# Patient Record
Sex: FEMALE | Race: BLACK OR AFRICAN AMERICAN | Employment: OTHER | URBAN - METROPOLITAN AREA
[De-identification: names, ages, dates, MRNs, and addresses within clinical notes are randomized per-mention and may not be internally consistent; named-entity substitution may affect disease eponyms.]

---

## 2017-05-16 PROBLEM — E55.9 VITAMIN D DEFICIENCY: Status: ACTIVE | Noted: 2017-05-16

## 2017-05-30 ENCOUNTER — HOSPITAL ENCOUNTER (EMERGENCY)
Age: 67
Discharge: HOME | End: 2017-05-31
Attending: EMERGENCY MEDICINE
Payer: MEDICARE

## 2017-05-30 DIAGNOSIS — E86.0 DEHYDRATION: ICD-10-CM

## 2017-05-30 DIAGNOSIS — R55 VASOVAGAL SYNCOPE: Primary | ICD-10-CM

## 2017-05-30 LAB
ALBUMIN SERPL BCP-MCNC: 3.4 G/DL (ref 3.2–4.6)
ALBUMIN/GLOB SERPL: 0.8 {RATIO} (ref 1.2–3.5)
ALP SERPL-CCNC: 68 U/L (ref 50–136)
ALT SERPL-CCNC: 12 U/L (ref 12–65)
ANION GAP BLD CALC-SCNC: 13 MMOL/L (ref 7–16)
AST SERPL W P-5'-P-CCNC: 9 U/L (ref 15–37)
BACTERIA URNS QL MICRO: ABNORMAL /HPF
BASOPHILS # BLD AUTO: 0 K/UL (ref 0–0.2)
BASOPHILS # BLD: 0 % (ref 0–2)
BILIRUB SERPL-MCNC: 0.3 MG/DL (ref 0.2–1.1)
BUN SERPL-MCNC: 22 MG/DL (ref 8–23)
CALCIUM SERPL-MCNC: 9.4 MG/DL (ref 8.3–10.4)
CASTS URNS QL MICRO: ABNORMAL /LPF
CHLORIDE SERPL-SCNC: 113 MMOL/L (ref 98–107)
CO2 SERPL-SCNC: 21 MMOL/L (ref 21–32)
CREAT SERPL-MCNC: 1.78 MG/DL (ref 0.6–1)
CRYSTALS URNS QL MICRO: 0 /LPF
DIFFERENTIAL METHOD BLD: ABNORMAL
EOSINOPHIL # BLD: 0 K/UL (ref 0–0.8)
EOSINOPHIL NFR BLD: 0 % (ref 0.5–7.8)
EPI CELLS #/AREA URNS HPF: ABNORMAL /HPF
ERYTHROCYTE [DISTWIDTH] IN BLOOD BY AUTOMATED COUNT: 18.3 % (ref 11.9–14.6)
GLOBULIN SER CALC-MCNC: 4.5 G/DL (ref 2.3–3.5)
GLUCOSE SERPL-MCNC: 109 MG/DL (ref 65–100)
HCT VFR BLD AUTO: 37.6 % (ref 35.8–46.3)
HGB BLD-MCNC: 12.1 G/DL (ref 11.7–15.4)
IMM GRANULOCYTES # BLD: 0.1 K/UL (ref 0–0.5)
IMM GRANULOCYTES NFR BLD AUTO: 0.4 % (ref 0–5)
LYMPHOCYTES # BLD AUTO: 18 % (ref 13–44)
LYMPHOCYTES # BLD: 2.3 K/UL (ref 0.5–4.6)
MCH RBC QN AUTO: 24.5 PG (ref 26.1–32.9)
MCHC RBC AUTO-ENTMCNC: 32.2 G/DL (ref 31.4–35)
MCV RBC AUTO: 76.3 FL (ref 79.6–97.8)
MONOCYTES # BLD: 0.9 K/UL (ref 0.1–1.3)
MONOCYTES NFR BLD AUTO: 7 % (ref 4–12)
MUCOUS THREADS URNS QL MICRO: ABNORMAL /LPF
NEUTS SEG # BLD: 9.4 K/UL (ref 1.7–8.2)
NEUTS SEG NFR BLD AUTO: 75 % (ref 43–78)
OTHER OBSERVATIONS,UCOM: ABNORMAL
PLATELET # BLD AUTO: 568 K/UL (ref 150–450)
PMV BLD AUTO: 9.8 FL (ref 10.8–14.1)
POTASSIUM SERPL-SCNC: 3.7 MMOL/L (ref 3.5–5.1)
PROT SERPL-MCNC: 7.9 G/DL (ref 6.3–8.2)
RBC # BLD AUTO: 4.93 M/UL (ref 4.05–5.25)
RBC #/AREA URNS HPF: ABNORMAL /HPF
SODIUM SERPL-SCNC: 147 MMOL/L (ref 136–145)
TROPONIN I SERPL-MCNC: <0.02 NG/ML (ref 0.02–0.05)
WBC # BLD AUTO: 12.8 K/UL (ref 4.3–11.1)
WBC URNS QL MICRO: ABNORMAL /HPF
YEAST URNS QL MICRO: ABNORMAL

## 2017-05-30 PROCEDURE — 81015 MICROSCOPIC EXAM OF URINE: CPT | Performed by: EMERGENCY MEDICINE

## 2017-05-30 PROCEDURE — 85025 COMPLETE CBC W/AUTO DIFF WBC: CPT | Performed by: EMERGENCY MEDICINE

## 2017-05-30 PROCEDURE — 74011250637 HC RX REV CODE- 250/637: Performed by: EMERGENCY MEDICINE

## 2017-05-30 PROCEDURE — 74011250636 HC RX REV CODE- 250/636: Performed by: EMERGENCY MEDICINE

## 2017-05-30 PROCEDURE — 84484 ASSAY OF TROPONIN QUANT: CPT | Performed by: EMERGENCY MEDICINE

## 2017-05-30 PROCEDURE — 99285 EMERGENCY DEPT VISIT HI MDM: CPT | Performed by: EMERGENCY MEDICINE

## 2017-05-30 PROCEDURE — 96360 HYDRATION IV INFUSION INIT: CPT | Performed by: EMERGENCY MEDICINE

## 2017-05-30 PROCEDURE — 80053 COMPREHEN METABOLIC PANEL: CPT | Performed by: EMERGENCY MEDICINE

## 2017-05-30 PROCEDURE — 93005 ELECTROCARDIOGRAM TRACING: CPT | Performed by: EMERGENCY MEDICINE

## 2017-05-30 PROCEDURE — 96361 HYDRATE IV INFUSION ADD-ON: CPT | Performed by: EMERGENCY MEDICINE

## 2017-05-30 RX ORDER — ACETAMINOPHEN 500 MG
1000 TABLET ORAL
Status: COMPLETED | OUTPATIENT
Start: 2017-05-30 | End: 2017-05-30

## 2017-05-30 RX ADMIN — ACETAMINOPHEN 1000 MG: 500 TABLET ORAL at 23:28

## 2017-05-30 RX ADMIN — SODIUM CHLORIDE 1000 ML: 900 INJECTION, SOLUTION INTRAVENOUS at 22:00

## 2017-05-30 NOTE — LETTER
[facility] [facility] EMERGENCY DEPT One [address] [id number] [phone] Work/School Note Date: 5/30/2017 To Whom It May concern: 
 
Chandler Cuellar was seen and treated today in the emergency room by the following provider(s): 
Attending Provider: Fadumo Luis DO. Chandler Cuellar may return to work on 6/1/17. Sincerely, Giovany Villeda RN

## 2017-05-30 NOTE — ED TRIAGE NOTES
Patient to ed via ems from work with c/o syncope while at work--ems #20 R AC--ems bgl 123--patient receiving IV fluids by ems--patient reports she became hot and dizzy while at work and reports the next thing she knew her coworkers were standing around her--awake, alert and oriented x4 in triage

## 2017-05-31 VITALS
HEIGHT: 67 IN | HEART RATE: 86 BPM | WEIGHT: 195 LBS | SYSTOLIC BLOOD PRESSURE: 172 MMHG | RESPIRATION RATE: 16 BRPM | DIASTOLIC BLOOD PRESSURE: 80 MMHG | OXYGEN SATURATION: 98 % | BODY MASS INDEX: 30.61 KG/M2 | TEMPERATURE: 98.4 F

## 2017-05-31 LAB
ATRIAL RATE: 71 BPM
CALCULATED P AXIS, ECG09: 56 DEGREES
CALCULATED R AXIS, ECG10: 9 DEGREES
CALCULATED T AXIS, ECG11: 44 DEGREES
DIAGNOSIS, 93000: NORMAL
P-R INTERVAL, ECG05: 206 MS
Q-T INTERVAL, ECG07: 386 MS
QRS DURATION, ECG06: 78 MS
QTC CALCULATION (BEZET), ECG08: 419 MS
VENTRICULAR RATE, ECG03: 71 BPM

## 2017-05-31 NOTE — ED NOTES
I have reviewed discharge instructions with the patient. The patient verbalized understanding. Patient had an even and steady gait out to lobby.

## 2017-05-31 NOTE — ED NOTES
Pt resting comfortably on stretcher. Family at bedside. Patient in NAD at this time, waiting for provider.

## 2017-05-31 NOTE — ED PROVIDER NOTES
HPI Comments: Patient presents per front after syncopal episode at work today. 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 She states that while she was at work she became lightheaded and diaphoretic and then while seated at her desk apparently had a syncopal episode. She states she members putting her head down on her desk and the next thing she knew she was on the floor with coworkers attempting to wake her up. She denies any pain from the fall although she appeared to sustain a small abrasion to her scalp. She denies prior occurrence syncopal episode but reports recent illness with an upper respiratory infection last week but had her out of work for several days and this was her first day back at work. Work conditions lately he has been hot and humid. Patient is a 78 y.o. female presenting with syncope. The history is provided by the patient. Syncope    This is a new problem. The current episode started 3 to 5 hours ago. Episode frequency: no prior. The problem has been resolved. She lost consciousness for a period of less than one minute. Associated with: seated. Associated symptoms include head injury. Pertinent negatives include no visual change, no chest pain, no palpitations, no clumsiness, no confusion, no fever, no malaise/fatigue, no abdominal pain, no bowel incontinence, no bladder incontinence, no congestion, no headaches, no back pain, no focal weakness, no light-headedness, no seizures, no slurred speech and no melena. She has tried nothing for the symptoms. The treatment provided no relief. Her past medical history is significant for HTN.         Past Medical History:   Diagnosis Date    Anemia     Chronic back pain     Essential hypertension     GERD (gastroesophageal reflux disease) 8/14/2012    Large Hiatal hernia     Obesity, Class I, BMI 30-34.9     Renal cyst, acquired, right     Spondylosis of lumbar joint     Vitamin D deficiency 5/16/2017       Past Surgical History:   Procedure Laterality Date    HX APPENDECTOMY      HX JAYLYN AND BSO           Family History:   Problem Relation Age of Onset    Cancer Mother      breast    Diabetes Mother     Breast Cancer Mother     Cancer Sister      breast    Breast Cancer Sister     Heart Disease Father      Infection s/p CABG    Arthritis-osteo Brother      Back and Knees    Obesity Sister     Diabetes Sister     Arthritis-osteo Sister      Needs Knee Replacement    Crohn's Disease Sister     Psychiatric Disorder Brother        Social History     Social History    Marital status:      Spouse name: N/A    Number of children: N/A    Years of education: N/A     Occupational History    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 Review of Systems   Constitutional: Negative for chills, fever and malaise/fatigue. HENT: Negative for congestion. Respiratory: Negative for chest tightness and shortness of breath. Cardiovascular: Positive for syncope. Negative for chest pain and palpitations. Gastrointestinal: Negative for abdominal pain, bowel incontinence and melena. Genitourinary: Negative for bladder incontinence. Musculoskeletal: Negative for back pain. Neurological: Negative for focal weakness, seizures, light-headedness and headaches. Psychiatric/Behavioral: Negative for confusion. All other systems reviewed and are negative.       Vitals:    05/30/17 1935 05/30/17 2029 05/30/17 2030 05/30/17 2032   BP: 128/72 154/74 134/85 142/85   Pulse: 79 84 88 84 Resp: 17      Temp: 98.2 °F (36.8 °C)      SpO2: 99%      Weight: 88.5 kg (195 lb)      Height: 5' 7\" (1.702 m)               Physical Exam   Constitutional: She is oriented to person, place, and time. She appears well-developed and well-nourished. No distress. HENT:   Head: Normocephalic. Right Ear: External ear normal.   Left Ear: External ear normal.   Mouth/Throat: No oropharyngeal exudate. Dry mucous membranes, tiny abrasion to the occipital area of the scalp no active bleeding   Eyes: Conjunctivae and EOM are normal. Pupils are equal, round, and reactive to light. Neck: Normal range of motion. Neck supple. Cardiovascular: Normal rate, regular rhythm and normal heart sounds. Pulmonary/Chest: Effort normal and breath sounds normal.   Abdominal: Soft. Bowel sounds are normal.   Neurological: She is alert and oriented to person, place, and time. Skin: Skin is warm and dry. Psychiatric: She has a normal mood and affect. Her behavior is normal.   Nursing note and vitals reviewed. MDM  Number of Diagnoses or Management Options  Diagnosis management comments: Given the elevated creatinine the patient will be given a liter of fluid. Although she is not orthostatic. Awaiting urinalysis.   Discharged follow-up with primary care physician and/or work well with instructions to increase by mouth fluids at home       Amount and/or Complexity of Data Reviewed  Clinical lab tests: ordered and reviewed  Tests in the radiology section of CPT®: ordered and reviewed  Independent visualization of images, tracings, or specimens: yes (EKG at 1937: Normal sinus rhythm, rate of 71 no acute ischemic changes are noted and no ectopy.)    Risk of Complications, Morbidity, and/or Mortality  Presenting problems: high  Diagnostic procedures: moderate  Management options: moderate    Patient Progress  Patient progress: stable    ED Course       Procedures

## 2017-05-31 NOTE — DISCHARGE INSTRUCTIONS
Dehydration: Care Instructions  Your Care Instructions  Dehydration happens when your body loses too much fluid. This might happen when you do not drink enough water or you lose large amounts of fluids from your body because of diarrhea, vomiting, or sweating. Severe dehydration can be life-threatening. Water and minerals called electrolytes help put your body fluids back in balance. Learn the early signs of fluid loss, and drink more fluids to prevent dehydration. Follow-up care is a key part of your treatment and safety. Be sure to make and go to all appointments, and call your doctor if you are having problems. It's also a good idea to know your test results and keep a list of the medicines you take. How can you care for yourself at home? · To prevent dehydration, drink plenty of fluids, enough so that your urine is light yellow or clear like water. Choose water and other caffeine-free clear liquids until you feel better. If you have kidney, heart, or liver disease and have to limit fluids, talk with your doctor before you increase the amount of fluids you drink. · If you do not feel like eating or drinking, try taking small sips of water, sports drinks, or other rehydration drinks. · Get plenty of rest.  To prevent dehydration  · Add more fluids to your diet and daily routine, unless your doctor has told you not to. · During hot weather, drink more fluids. Drink even more fluids if you exercise a lot. Stay away from drinks with alcohol or caffeine. · Watch for the symptoms of dehydration. These include:  ¨ A dry, sticky mouth. ¨ Dark yellow urine, and not much of it. ¨ Dry and sunken eyes. ¨ Feeling very tired. · Learn what problems can lead to dehydration. These include:  ¨ Diarrhea, fever, and vomiting. ¨ Any illness with a fever, such as pneumonia or the flu. ¨ Activities that cause heavy sweating, such as endurance races and heavy outdoor work in hot or humid weather.   ¨ Alcohol or drug abuse or withdrawal.  ¨ Certain medicines, such as cold and allergy pills (antihistamines), diet pills (diuretics), and laxatives. ¨ Certain diseases, such as diabetes, cancer, and heart or kidney disease. When should you call for help? Call 911 anytime you think you may need emergency care. For example, call if:  · You passed out (lost consciousness). Call your doctor now or seek immediate medical care if:  · You are confused and cannot think clearly. · You are dizzy or lightheaded, or you feel like you may faint. · You have signs of needing more fluids. You have sunken eyes and a dry mouth, and you pass only a little dark urine. · You cannot keep fluids down. Watch closely for changes in your health, and be sure to contact your doctor if:  · You are not making tears. · Your skin is very dry and sags slowly back into place after you pinch it. · Your mouth and eyes are very dry. Where can you learn more? Go to http://yvonne-catalina.info/. Enter V515 in the search box to learn more about \"Dehydration: Care Instructions. \"  Current as of: May 27, 2016  Content Version: 11.2  © 3300-0276 [employer]. Care instructions adapted under license by [employer] (which disclaims liability or warranty for this information). If you have questions about a medical condition or this instruction, always ask your healthcare professional. [place] [address] any warranty or liability for your use of this information. Fainting: Care Instructions  Your Care Instructions    When you faint, or pass out, you lose consciousness for a short time. A brief drop in blood flow to the brain often causes it. When you fall or lie down, more blood flows to your brain and you regain consciousness. Emotional stress, pain, or overheating--especially if you have been standing--can make you faint. In these cases, fainting is usually not serious.  But fainting can be a sign of a more serious problem. Your doctor may want you to have more tests to rule out other causes. The treatment you need depends on the reason why you fainted. The doctor has checked you carefully, but problems can develop later. If you notice any problems or new symptoms, get medical treatment right away. Follow-up care is a key part of your treatment and safety. Be sure to make and go to all appointments, and call your doctor if you are having problems. It's also a good idea to know your test results and keep a list of the medicines you take. How can you care for yourself at home? · Drink plenty of fluids to prevent dehydration. If you have kidney, heart, or liver disease and have to limit fluids, talk with your doctor before you increase your fluid intake. When should you call for help? Call 911 anytime you think you may need emergency care. For example, call if:  · You have symptoms of a heart problem. These may include:  ¨ Chest pain or pressure. ¨ Severe trouble breathing. ¨ A fast or irregular heartbeat. ¨ Lightheadedness or sudden weakness. ¨ Coughing up pink, foamy mucus. ¨ Passing out. After you call 911, the  may tell you to chew 1 adult-strength or 2 to 4 low-dose aspirin. Wait for an ambulance. Do not try to drive yourself. · You have symptoms of a stroke. These may include:  ¨ Sudden numbness, tingling, weakness, or loss of movement in your face, arm, or leg, especially on only one side of your body. ¨ Sudden vision changes. ¨ Sudden trouble speaking. ¨ Sudden confusion or trouble understanding simple statements. ¨ Sudden problems with walking or balance. ¨ A sudden, severe headache that is different from past headaches. · You passed out (lost consciousness) again. Watch closely for changes in your health, and be sure to contact your doctor if:  · You do not get better as expected. Where can you learn more? Go to http://yvonne-catalina.info/.   Enter P904 in the search box to learn more about \"Fainting: Care Instructions. \"  Current as of: May 27, 2016  Content Version: 11.2  © 5877-1499 [employer]. Care instructions adapted under license by [employer] (which disclaims liability or warranty for this information). If you have questions about a medical condition or this instruction, always ask your healthcare professional. [place] [address] any warranty or liability for your use of this information.

## 2017-06-01 ENCOUNTER — PATIENT OUTREACH (OUTPATIENT)
Dept: CASE MANAGEMENT | Age: 67
End: 2017-06-01

## 2017-06-01 NOTE — PROGRESS NOTES
Attempted NAVNEET #1 no answer, left vmail requesting return call. Scheduled NAVNEET #2 for tomorrow 6/2/17. Eddie Hallman LPN/ Care Coordinator  [facility] [address]. [place] [address] / [place], [address] [facility]  [email or web address]. [facility] note will not be viewable in [address]

## 2017-06-02 ENCOUNTER — PATIENT OUTREACH (OUTPATIENT)
Dept: CASE MANAGEMENT | Age: 67
End: 2017-06-02

## 2017-06-02 NOTE — PROGRESS NOTES
Attempted NAVNEET #2 no answer, left vmail and scheduled NAVNEET #3 6/8/17. Zack Boone LPN/ Care Coordinator  [address]. [facility] [place], [address] [facility]  [email or web address]

## 2017-06-08 ENCOUNTER — PATIENT OUTREACH (OUTPATIENT)
Dept: CASE MANAGEMENT | Age: 67
End: 2017-06-08

## 2017-06-09 NOTE — PROGRESS NOTES
Attempted NAVNEET #3. Left vmail requesting return call. UTR patient Will close case. Kash Escalante LPN/ Care Coordinator  [address]. [facility] [place], [address] [facility]  [email or web address] note will not be viewable in [address]

## 2017-07-14 PROBLEM — R55 SYNCOPE: Status: ACTIVE | Noted: 2017-07-14

## 2017-07-14 PROBLEM — R00.2 PALPITATIONS: Status: ACTIVE | Noted: 2017-07-14

## 2017-07-17 ENCOUNTER — HOSPITAL ENCOUNTER (OUTPATIENT)
Dept: MRI IMAGING | Age: 67
Discharge: HOME | End: 2017-07-17
Attending: INTERNAL MEDICINE

## 2017-07-17 DIAGNOSIS — I10 ESSENTIAL HYPERTENSION: ICD-10-CM

## 2017-07-17 DIAGNOSIS — R42 DIZZINESS: ICD-10-CM

## 2017-07-17 NOTE — PROGRESS NOTES
Patient to reschedule exam when she does not have hair gel in hair. The gel is causing artifact on images.

## 2017-07-19 ENCOUNTER — HOSPITAL ENCOUNTER (OUTPATIENT)
Dept: MRI IMAGING | Age: 67
Discharge: HOME | End: 2017-07-19
Attending: INTERNAL MEDICINE
Payer: MEDICARE

## 2017-07-19 DIAGNOSIS — M54.50 LOW BACK PAIN RADIATING TO RIGHT LEG: ICD-10-CM

## 2017-07-19 DIAGNOSIS — M79.604 LOW BACK PAIN RADIATING TO RIGHT LEG: ICD-10-CM

## 2017-07-19 DIAGNOSIS — M54.2 NECK PAIN: ICD-10-CM

## 2017-07-19 DIAGNOSIS — M79.601 RIGHT ARM PAIN: ICD-10-CM

## 2017-07-19 PROCEDURE — 72141 MRI NECK SPINE W/O DYE: CPT

## 2017-07-19 PROCEDURE — 72148 MRI LUMBAR SPINE W/O DYE: CPT

## 2017-07-31 PROBLEM — K63.5 COLON POLYPS: Status: ACTIVE | Noted: 2017-07-26

## 2017-08-07 ENCOUNTER — HOSPITAL ENCOUNTER (OUTPATIENT)
Dept: MRI IMAGING | Age: 67
Discharge: HOME | End: 2017-08-07
Attending: INTERNAL MEDICINE

## 2017-08-07 NOTE — PROGRESS NOTES
Patient came for MRI brain for second attempt due to hair spray causing artifact on images. She still had the same artifact on the images. Patient was advised to reschedule MRI again and not wear any product in her hair.

## 2017-08-17 ENCOUNTER — HOSPITAL ENCOUNTER (OUTPATIENT)
Dept: MRI IMAGING | Age: 67
Discharge: HOME | End: 2017-08-17
Attending: INTERNAL MEDICINE
Payer: MEDICARE

## 2017-08-17 PROCEDURE — 70551 MRI BRAIN STEM W/O DYE: CPT

## 2017-08-18 PROBLEM — M51.9 LUMBAR DISC DISEASE: Status: ACTIVE | Noted: 2017-08-18

## 2017-08-18 NOTE — PROGRESS NOTES
Will discuss results further at the following upcoming appointment:   8/18/2017  8:20 Eric Johnson MD          [facility]  8/24/2017  9:00 AM    Leeanna Pagan MD     [facility] UC       UCD  9/6/2017   9:20 AM    Deneen Schwarz MD          [facility]  9/6/2017   10:00 AM   [facility] MCR WELLNESS           [facility]  9/8/2017   8:30 AM    Paulino Almonte MD        [facility]  11/13/2017 8:20 AM    SFE DEXA BI GE LUNAR DEXA  SFERMAM        SFE  11/13/2017 8:45 AM    SFE BENITO BI ROOM 1          SFERMAM        SFE

## 2017-09-08 ENCOUNTER — HOSPITAL ENCOUNTER (OUTPATIENT)
Dept: GENERAL RADIOLOGY | Age: 67
Discharge: HOME | End: 2017-09-08
Payer: MEDICARE

## 2017-09-08 DIAGNOSIS — M54.16 LUMBAR RADICULOPATHY: ICD-10-CM

## 2017-09-08 PROCEDURE — 72100 X-RAY EXAM L-S SPINE 2/3 VWS: CPT

## 2017-09-08 PROCEDURE — 72120 X-RAY BEND ONLY L-S SPINE: CPT

## 2017-09-08 NOTE — PROGRESS NOTES
Will discuss results further at the following upcoming appointment:   9/14/2017  9:00 AM    Karyn Leyden, PT  [facility]  9/18/2017  8:20 AM    Carolyn Shelton MD          [facility]  10/5/2017  9:00 AM    Paolo Torres MD     [facility]       UCD  10/25/2017 8:30 AM    Merline Fox, MD        [address] [place] [address]  11/13/2017 8:20 AM    SFE DEXA BI GE LUNAR DEXA  SFERMAM        SFE  11/13/2017 8:45 AM    SFE BENITO BI ROOM 1          SFERMAM        SFE

## 2017-09-08 NOTE — PROGRESS NOTES
Will discuss results further at the following upcoming appointment:   9/14/2017  9:00 AM    Lashell Holden PT  [facility]  9/18/2017  8:20 AM    Jakub Sanchez MD          [facility]  10/5/2017  9:00 AM    Homer Donahue MD     [facility] UC       UCD  10/25/2017 8:30 AM    Cortney Calvin MD        [address] [facility] [address]  11/13/2017 8:20 AM    SFE DEXA BI  LUNAR DEXA  SFERMAM        SFE  11/13/2017 8:45 AM    SFE BENITO BI ROOM 1          SFERMAM        SFE

## 2017-09-14 ENCOUNTER — HOSPITAL ENCOUNTER (OUTPATIENT)
Dept: PHYSICAL THERAPY | Age: 67
Discharge: HOME | End: 2017-09-14
Attending: NEUROLOGICAL SURGERY
Payer: MEDICARE

## 2017-09-14 DIAGNOSIS — M48.061 SPINAL STENOSIS OF LUMBAR REGION WITH RADICULOPATHY: ICD-10-CM

## 2017-09-14 DIAGNOSIS — M54.16 SPINAL STENOSIS OF LUMBAR REGION WITH RADICULOPATHY: ICD-10-CM

## 2017-09-14 PROCEDURE — 97110 THERAPEUTIC EXERCISES: CPT

## 2017-09-14 PROCEDURE — G8979 MOBILITY GOAL STATUS: HCPCS

## 2017-09-14 PROCEDURE — 97161 PT EVAL LOW COMPLEX 20 MIN: CPT

## 2017-09-14 PROCEDURE — G8978 MOBILITY CURRENT STATUS: HCPCS

## 2017-09-14 NOTE — PROGRESS NOTES
Ambulatory/Rehab Services H2 Model Falls Risk Assessment    Risk Factor Pts. ·   Confusion/Disorientation/Impulsivity  []    4 ·   Symptomatic Depression  []   2 ·   Altered Elimination  []   1 ·   Dizziness/Vertigo  []   1 ·   Gender (Male)  []   1 ·   Any administered antiepileptics (anticonvulsants):  []   2 ·   Any administered benzodiazepines:  []   1 ·   Visual Impairment (specify):  []   1 ·   Portable Oxygen Use  []   1 ·   Orthostatic ? BP  []   1 ·   History of Recent Falls (within 3 mos.)  []   5     Ability to Rise from Chair (choose one) Pts. ·   Ability to rise in a single movement  []   0 ·   Pushes up, successful in one attempt  []   1 ·   Multiple attempts, but successful  []   3 ·   Unable to rise without assistance  []   4   Total: (5 or greater = High Risk) 0     Falls Prevention Plan:   []                Physical Limitations to Exercise (specify):   []                Mobility Assistance Device (type):   []                Exercise/Equipment Adaptation (specify):    ©2010 [facility] [address] [place] Patent #[id number].  Federal Law prohibits the replication, distribution or use without written permission from [facility] [employer]

## 2017-09-14 NOTE — PROGRESS NOTES
Sinan Hall  : 1950 [facility], [facility], [address]  [phone]   [phone]         OUTPATIENT PHYSICAL THERAPY:Initial Assessment and Treatment Day: Day of Assessment 2017    ICD-10: Treatment Diagnosis: M54.5 Low back pain  Precautions/Allergies:   Review of patient's allergies indicates no known allergies. Fall Risk Score: 0 (? 5 = High Risk)  MD Orders: PT evaluation and treatment  MEDICAL/REFERRING DIAGNOSIS:  Spinal stenosis of lumbar region with radiculopathy [M48.06, M54.16]   DATE OF ONSET: Acute on Chronic   REFERRING PHYSICIAN: Cornel Ron MD  RETURN PHYSICIAN APPOINTMENT: Pending      INITIAL ASSESSMENT:  Ms. Mohit Trammell presents with lumbar degenerative changes with L4/L5 extrusion with concurrent movement restriction, lumbar and right gluteal and sacroiliac region with limitations trunk mobility, CORE and proximal LE strength loss. She has been unable to work. Strength is limited hip abduction and hip extension 3+/5. Trunk mobility at 66% except for extension 33% limited. Skilled PT is indicated for her functional mobility deficits and increase CORE stabilization. PROBLEM LIST:  1. Decreased Strength affecting function  2. Decreased ADL/Functional Activities  3. Decreased Flexibility/joint mobility  4. Increased Pain affecting function  5. Decreased Activity tolerance   6. Increased Fatigue affecting function    INTERVENTIONS PLANNED:  1. Balance Exercise  2. Gait Training  3. Home Exercise Program (HEP)  4. Neuromuscular Re-education/Strengthening  5. Therapeutic Activites  6. Therapeutic Exercise/Strengthening  7. Transfer Training  8. Manual Therapy      TREATMENT PLAN: Effective Dates: 2017 TO 2017   Frequency/Duration: up to 3 times a week for 12 weeks  GOALS: (Goals have been discussed and agreed upon with patient.)  DISCHARGE GOALS: Time Frame: 12 weeks   1.  Pt will be compliant and independent with HEP in order to increase lumbar mobility and LE and core strength/endurance to improve quality of life. 2. Pt will reduce score on Modified Oswestry Low Back Pain Questionnaire to 20/50 in order to demonstrate improved functional mobility and quality of life. 3. Pt will report standing for > 60 minutes with minimal to no increase in pain in order to be able to stand for prolonged periods as needed to perform standing for home activities. 4. Patient to demonstrate increased strength in bilateral LE's 1/2 muscle grade. REHABILITATION POTENTIAL FOR STATED GOALS: GOOD     PLAN : Patient to continue with treatment per plan of care progressing to functional goal achievement utilizing interventions per problem list up to 3 x's per week until goals met or reassessment, or discharge. Regarding Victor Hugo Flores's therapy, I certify that the treatment plan above will be carried out by a therapist or under their direction. Thank you for this referral,  Maki Hua PT     Referring Physician Signature: Sarah Cheung MD              Date                    The information in this section was collected on 9/16/2017  (except where otherwise noted). HISTORY:    History of Present Injury/Illness (Reason for Referral)  This kind patient is reporting pain in the lower spine with intermittent spinal parasthesia. She had an MRI which indicated the following findings :    1. At L4-5, there is a focal central disc extrusion resulting in severe central  stenosis. There is also mild left neural foraminal narrowing. 2. Multilevel lumbar spondylosis at additional levels as described. Neurosurgery has opted for physical therapy and [facility]IC for remission. She is improving overall about 25% but still has quite a bit of pain reported, which has kept her from working recently. She desires to be more active and increase mobility with return to prior function.       Past Medical History/Comorbidities:   Ms. Giovanna Martin  has a past medical history of Anemia; Chronic back pain; Colon polyps (07/26/2017); Essential hypertension; GERD (gastroesophageal reflux disease) (8/14/2012); Large Hiatal hernia; Lumbar disc disease (8/18/2017); Obesity, Class I, BMI 30-34.9; Renal cyst, acquired, right; Spondylosis of lumbar joint; and Vitamin D deficiency (5/16/2017). Ms. Giovanna Martin  has a past surgical history that includes manolo and bso; appendectomy; endoscopy (07/26/2017); and colonoscopy (07/26/2017). Social History/Living Environment:     No limitation  Prior Level of Function/Work/Activity:  Had been independent with all functional mobility, currently with limited mobility and ambulation, unable to work at this time. Dominant Side:         RIGHT  Personal Factors:          Sex:  female        Age:  75 y.o. Current Medications:       Current Outpatient Prescriptions:     meloxicam (MOBIC) 7.5 mg tablet, Take 1 Tab by mouth two (2) times daily (after meals). , Disp: 60 Tab, Rfl: 2    HYDROcodone-acetaminophen (NORCO)  mg tablet, Take 1/2 to 1 tab three times daily as needed for pain, Disp: 84 Tab, Rfl: 0    metoprolol succinate (TOPROL-XL) 50 mg XL tablet, Take 1 Tab by mouth daily. , Disp: 30 Tab, Rfl: 3    esomeprazole (NEXIUM) 40 mg capsule, TAKE 1 CAPSULE DAILY, Disp: 90 Cap, Rfl: 3    cholecalciferol (VITAMIN D3) 1,000 unit tablet, Take  by mouth daily. , Disp: , Rfl:    Date Last Reviewed:  9/16/2017     OBJECTIVE :      ROM:  TRUNK ROM limited at 66% in all planes of motion and hip extension at 33%    PALPATION :   Palpably tender in the lumbar spine and right SI region.        Strength:  Hip strength limited at bilateral sides at 3+/5 hip extension, abduction, and flexion ,  CORE strength at 3+/5 or less   Neurological Screen:       Dermatomes: intact      Sensation: intact bilateral LE's light touch and pressure        Functional Mobility:       Gait/Ambulation: antalgic with decreased devan and mobility      Transfers:  decreased transfers at slow independent mobility, slightly antalgic    Balance:  Single leg stance at less than 5 seconds each side. Mental Status:  Intact alert, oriented, coherent and lucidity. Number of Personal Factors/Comorbidities that affect the Plan of Care: 1-2: MODERATE COMPLEXITY   EXAMINATION:         Body Functions Affected:  1. Neuromusculoskeletal  2. Movement Related  3. Skin Related  4. Digestive  5. Metobolic/Endocrine Activities and Participation Affected:  1. General Tasks and Demands  2. Mobility  3. Self Care  4. Domestic Life Body Structures Involved:  1. Nerves  2. Bones  3. Joints  4. Muscles   5. Ligaments    Number of elements (examined above) that affect the Plan of Care:  3: MODERATE COMPLEXITY   CLINICAL PRESENTATION:      Presentation: Stable and uncomplicated: LOW COMPLEXITY   CLINICAL DECISION MAKING:    Outcome Measure: Tool Used: Modified Oswestry Low Back Pain Questionnaire  Score:  Initial: 28/50  Most Recent: X/50 (Date: -- )   Interpretation of Score: Each section is scored on a 0-5 scale, 5 representing the greatest disability. The scores of each section are added together for a total score of 50. Score 0 1-10 11-20 21-30 31-40 41-49 50   Modifier CH CI CJ CK CL CM CN     ? Mobility - Walking and Moving Around:     - CURRENT STATUS: CK - 40%-59% impaired, limited or restricted    - GOAL STATUS: CJ - 20%-39% impaired, limited or restricted    - D/C STATUS:  ---------------To be determined---------------      Medical Necessity:   · Patient demonstrates good rehab potential due to higher previous functional level. Reason for Services/Other Comments:  · Patient continues to require skilled intervention due to medical complications, patient unable to attend/participate in therapy as expected and decreased mobility affecting work and functional status.  ·      Use of outcome tool(s) and clinical judgement create a POC that gives a: Clear prediction of patient's progress: LOW COMPLEXITY            TREATMENT:   (In addition to Assessment/Re-Assessment sessions the following treatments were rendered)  Pre-treatment Symptoms/Complaints:  7/10 in the lower back with mechanical movement   Pain: Initial:     7/10 in the lower back and the LE's. Post Session:  6/10 in the spine and LE's improved post therapeutic exercise      THERAPEUTIC EXERCISE: (15 mins minutes):  Exercises per grid below to improve mobility, strength, balance, coordination and dynamic movement of leg - bilateral to improve functional dynamic mobility . Required minimal visual, verbal, manual and tactile cues to promote proper body alignment, promote proper body posture and promote proper body mechanics. Progressed repetitions as indicated. Date:  9/14/2017  Date:   Date:     Activity/Exercise Parameters Parameters Parameters   Supine with trunk reciprocation   15 x's in supine      Gluteal activation  20 x's and with walking                                    Modalities with heat in supine with heat for the lower spine to increase mobility and blood flow for 15 mins with LE's elevated. [employer] Portal  Treatment/Session Assessment:    · Response to Treatment:  Improved mobility   · Compliance with Program/Exercises: compliant all of the time. · Recommendations/Intent for next treatment session: \"Next visit will focus on advancements to more challenging activities, reduction in assistance provided and LE mobility and transfers with LB strengthening. \".   Total Treatment Duration:  PT Patient Time In/Time Out  Time In: 0900  Time Out: [address], PT

## 2017-09-18 ENCOUNTER — HOSPITAL ENCOUNTER (OUTPATIENT)
Dept: PHYSICAL THERAPY | Age: 67
Discharge: HOME | End: 2017-09-18
Attending: NEUROLOGICAL SURGERY
Payer: MEDICARE

## 2017-09-18 PROBLEM — F32.A DEPRESSION: Status: ACTIVE | Noted: 2017-09-18

## 2017-09-18 PROCEDURE — 97110 THERAPEUTIC EXERCISES: CPT

## 2017-09-18 NOTE — PROGRESS NOTES
Kelli Dey  : 1950 [facility], [address], [address]  [phone]   [phone]         OUTPATIENT PHYSICAL THERAPY:Initial Assessment and Treatment Day: Day of Assessment 2017    ICD-10: Treatment Diagnosis: M54.5 Low back pain  Precautions/Allergies:   Review of patient's allergies indicates no known allergies. Fall Risk Score: 0 (? 5 = High Risk)  MD Orders: PT evaluation and treatment  MEDICAL/REFERRING DIAGNOSIS:  Spinal stenosis of lumbar region with radiculopathy [M48.06, M54.16]   DATE OF ONSET: Acute on Chronic   REFERRING PHYSICIAN: Jared Garcia MD  RETURN PHYSICIAN APPOINTMENT: Pending      INITIAL ASSESSMENT:  Ms. Saskia Gama presents with lumbar degenerative changes with L4/L5 extrusion with concurrent movement restriction, lumbar and right gluteal and sacroiliac region with limitations trunk mobility, CORE and proximal LE strength loss. She has been unable to work. Strength is limited hip abduction and hip extension 3+/5. Trunk mobility at 66% except for extension 33% limited. Skilled PT is indicated for her functional mobility deficits and increase CORE stabilization. PROBLEM LIST:  1. Decreased Strength affecting function  2. Decreased ADL/Functional Activities  3. Decreased Flexibility/joint mobility  4. Increased Pain affecting function  5. Decreased Activity tolerance   6. Increased Fatigue affecting function    INTERVENTIONS PLANNED:  1. Balance Exercise  2. Gait Training  3. Home Exercise Program (HEP)  4. Neuromuscular Re-education/Strengthening  5. Therapeutic Activites  6. Therapeutic Exercise/Strengthening  7. Transfer Training  8. Manual Therapy      TREATMENT PLAN: Effective Dates: 2017 TO 2017   Frequency/Duration: up to 3 times a week for 12 weeks  GOALS: (Goals have been discussed and agreed upon with patient.)  DISCHARGE GOALS: Time Frame: 12 weeks   1.  Pt will be compliant and independent with HEP in order to increase lumbar mobility and LE and core strength/endurance to improve quality of life. 2. Pt will reduce score on Modified Oswestry Low Back Pain Questionnaire to 20/50 in order to demonstrate improved functional mobility and quality of life. 3. Pt will report standing for > 60 minutes with minimal to no increase in pain in order to be able to stand for prolonged periods as needed to perform standing for home activities. 4. Patient to demonstrate increased strength in bilateral LE's 1/2 muscle grade. REHABILITATION POTENTIAL FOR STATED GOALS: GOOD     PLAN : Patient to continue with treatment per plan of care progressing to functional goal achievement utilizing interventions per problem list up to 3 x's per week until goals met or reassessment, or discharge. Regarding Jerald Flores's therapy, I certify that the treatment plan above will be carried out by a therapist or under their direction. Thank you for this referral,  Alanna Grey PTA     Referring Physician Signature: Anca Wells MD              Date                    The information in this section was collected on 9/18/2017  (except where otherwise noted). HISTORY:    History of Present Injury/Illness (Reason for Referral)  This kind patient is reporting pain in the lower spine with intermittent spinal parasthesia. She had an MRI which indicated the following findings :    1. At L4-5, there is a focal central disc extrusion resulting in severe central  stenosis. There is also mild left neural foraminal narrowing. 2. Multilevel lumbar spondylosis at additional levels as described. Neurosurgery has opted for physical therapy and [facility] for remission. She is improving overall about 25% but still has quite a bit of pain reported, which has kept her from working recently. She desires to be more active and increase mobility with return to prior function.       Past Medical History/Comorbidities:   Ms. Yahir Beach  has a past medical history of Anemia; Chronic back pain; Colon polyps (07/26/2017); Depression (9/18/2017); Essential hypertension; GERD (gastroesophageal reflux disease) (8/14/2012); Large Hiatal hernia; Lumbar disc disease (8/18/2017); Obesity, Class I, BMI 30-34.9; Renal cyst, acquired, right; Spondylosis of lumbar joint; and Vitamin D deficiency (5/16/2017). Ms. Yahir Beach  has a past surgical history that includes manolo and bso; appendectomy; endoscopy (07/26/2017); and colonoscopy (07/26/2017). Social History/Living Environment:     No limitation  Prior Level of Function/Work/Activity:  Had been independent with all functional mobility, currently with limited mobility and ambulation, unable to work at this time. Dominant Side:         RIGHT  Personal Factors:          Sex:  female        Age:  75 y.o. Current Medications:       Current Outpatient Prescriptions:     FLUoxetine (PROZAC) 10 mg capsule, Take 10mg daily for 7 days then increase to 20mg daily (2 caps), Disp: 60 Cap, Rfl: 3    meloxicam (MOBIC) 7.5 mg tablet, Take 1 Tab by mouth two (2) times daily (after meals). , Disp: 60 Tab, Rfl: 2    HYDROcodone-acetaminophen (NORCO)  mg tablet, Take 1/2 to 1 tab three times daily as needed for pain, Disp: 84 Tab, Rfl: 0    metoprolol succinate (TOPROL-XL) 50 mg XL tablet, Take 1 Tab by mouth daily. , Disp: 30 Tab, Rfl: 3    esomeprazole (NEXIUM) 40 mg capsule, TAKE 1 CAPSULE DAILY, Disp: 90 Cap, Rfl: 3    cholecalciferol (VITAMIN D3) 1,000 unit tablet, Take  by mouth daily. , Disp: , Rfl:    Date Last Reviewed:  9/18/2017     OBJECTIVE :      ROM:  TRUNK ROM limited at 66% in all planes of motion and hip extension at 33%    PALPATION :   Palpably tender in the lumbar spine and right SI region.        Strength:  Hip strength limited at bilateral sides at 3+/5 hip extension, abduction, and flexion ,  CORE strength at 3+/5 or less   Neurological Screen: Dermatomes: intact      Sensation: intact bilateral LE's light touch and pressure        Functional Mobility:       Gait/Ambulation: antalgic with decreased devan and mobility      Transfers:  decreased transfers at slow independent mobility, slightly antalgic    Balance:  Single leg stance at less than 5 seconds each side. Mental Status:  Intact alert, oriented, coherent and lucidity. Number of Personal Factors/Comorbidities that affect the Plan of Care: 1-2: MODERATE COMPLEXITY   EXAMINATION:         Body Functions Affected:  1. Neuromusculoskeletal  2. Movement Related  3. Skin Related  4. Digestive  5. Metobolic/Endocrine Activities and Participation Affected:  1. General Tasks and Demands  2. Mobility  3. Self Care  4. Domestic Life Body Structures Involved:  1. Nerves  2. Bones  3. Joints  4. Muscles   5. Ligaments    Number of elements (examined above) that affect the Plan of Care:  3: MODERATE COMPLEXITY   CLINICAL PRESENTATION:      Presentation: Stable and uncomplicated: LOW COMPLEXITY   CLINICAL DECISION MAKING:    Outcome Measure: Tool Used: Modified Oswestry Low Back Pain Questionnaire  Score:  Initial: 28/50  Most Recent: X/50 (Date: -- )   Interpretation of Score: Each section is scored on a 0-5 scale, 5 representing the greatest disability. The scores of each section are added together for a total score of 50. Score 0 1-10 11-20 21-30 31-40 41-49 50   Modifier CH CI CJ CK CL CM CN     ? Mobility - Walking and Moving Around:     - CURRENT STATUS: CK - 40%-59% impaired, limited or restricted    - GOAL STATUS: CJ - 20%-39% impaired, limited or restricted    - D/C STATUS:  ---------------To be determined---------------      Medical Necessity:   · Patient demonstrates good rehab potential due to higher previous functional level.   Reason for Services/Other Comments:  · Patient continues to require skilled intervention due to medical complications, patient unable to attend/participate in therapy as expected and decreased mobility affecting work and functional status. ·      Use of outcome tool(s) and clinical judgement create a POC that gives a: Clear prediction of patient's progress: LOW COMPLEXITY            TREATMENT:   (In addition to Assessment/Re-Assessment sessions the following treatments were rendered)  Pre-treatment Symptoms/Complaints:  5/10 in the lower back with mechanical movement and pain down both legs at times. Patient states she saw MD this a.m. For a follow up appointment and blood pressure is high. Patient states she is having a endoscopy tomorrow and has been off of her medications since Friday and that is probably why her blood pressure is elevated today. Patient states she is anemic and they are testing her to find where she is loosing blood. Patient states that she feels blah today. No headache. Blood pressure prior to treatment 166/106 and heart rate 81  Blood pressure after treatment 155/91 and hear rate 74            Pain: Initial:     5/10 in the lower back and the LE's. Post Session:  No change in pain     THERAPEUTIC EXERCISE: (  25 minutes):  Exercises per grid below to improve mobility, strength, balance, coordination and dynamic movement of leg - bilateral to improve functional dynamic mobility . Required minimal visual, verbal, manual and tactile cues to promote proper body alignment, promote proper body posture and promote proper body mechanics. Progressed repetitions as indicated.    Date:  9/14/2017  Date:  9/18/17 Date:     Activity/Exercise Parameters Parameters Parameters   Supine with trunk reciprocation   15 x's in supine  X 10 reps in supine  B    Gluteal activation  20 x's and with walking  Hook lying  X 20 reps     Single knee to chest  3 x 20 second hold B  (DKTC increased groin pain)    Piriformis stretch   3 x 30 second hold B     Transversus abdominis  10 x 5 second hold                   MODALITIES: (15 minutes) patient supine with knee wedge in place for moist heat to low back to improve blood flow and improve mobility. [employer] Portal  Treatment/Session Assessment:  Patient with some discomfort with some exercises but instructed to perform all exercises in comfortable range. Patient understood and agreeable. Patient given written handouts of HEP. · Response to Treatment:  Improved mobility   · Compliance with Program/Exercises: compliant all of the time. · Recommendations/Intent for next treatment session: \"Next visit will focus on advancements to more challenging activities, reduction in assistance provided and LE mobility and transfers with LB strengthening. \".   Total Treatment Duration:  PT Patient Time In/Time Out  Time In: 1430  Time Out: [address], PTA

## 2017-09-20 ENCOUNTER — HOSPITAL ENCOUNTER (OUTPATIENT)
Dept: PHYSICAL THERAPY | Age: 67
Discharge: HOME | End: 2017-09-20
Attending: NEUROLOGICAL SURGERY
Payer: MEDICARE

## 2017-09-20 PROCEDURE — 97110 THERAPEUTIC EXERCISES: CPT

## 2017-09-20 NOTE — PROGRESS NOTES
Cammy Terrazas  : 1950 [facility], [address], [facility], [address] [facility]  [phone]   [phone]         OUTPATIENT PHYSICAL THERAPY:Daily Note 2017    ICD-10: Treatment Diagnosis: M54.5 Low back pain  Precautions/Allergies:   Review of patient's allergies indicates no known allergies. Fall Risk Score: 0 (? 5 = High Risk)  MD Orders: PT evaluation and treatment  MEDICAL/REFERRING DIAGNOSIS:  Spinal stenosis of lumbar region with radiculopathy [M48.06, M54.16]   DATE OF ONSET: Acute on Chronic   REFERRING PHYSICIAN: Dagoberto Maurer MD  RETURN PHYSICIAN APPOINTMENT: Pending      INITIAL ASSESSMENT:  Ms. Saint Clair presents with lumbar degenerative changes with L4/L5 extrusion with concurrent movement restriction, lumbar and right gluteal and sacroiliac region with limitations trunk mobility, CORE and proximal LE strength loss. She has been unable to work. Strength is limited hip abduction and hip extension 3+/5. Trunk mobility at 66% except for extension 33% limited. Skilled PT is indicated for her functional mobility deficits and increase CORE stabilization. PROBLEM LIST:  1. Decreased Strength affecting function  2. Decreased ADL/Functional Activities  3. Decreased Flexibility/joint mobility  4. Increased Pain affecting function  5. Decreased Activity tolerance   6. Increased Fatigue affecting function    INTERVENTIONS PLANNED:  1. Balance Exercise  2. Gait Training  3. Home Exercise Program (HEP)  4. Neuromuscular Re-education/Strengthening  5. Therapeutic Activites  6. Therapeutic Exercise/Strengthening  7. Transfer Training  8. Manual Therapy      TREATMENT PLAN: Effective Dates: 2017 TO 2017   Frequency/Duration: up to 3 times a week for 12 weeks  GOALS: (Goals have been discussed and agreed upon with patient.)  DISCHARGE GOALS: Time Frame: 12 weeks   1.  Pt will be compliant and independent with HEP in order to increase lumbar mobility and LE and core strength/endurance to improve quality of life. 2. Pt will reduce score on Modified Oswestry Low Back Pain Questionnaire to 20/50 in order to demonstrate improved functional mobility and quality of life. 3. Pt will report standing for > 60 minutes with minimal to no increase in pain in order to be able to stand for prolonged periods as needed to perform standing for home activities. 4. Patient to demonstrate increased strength in bilateral LE's 1/2 muscle grade. REHABILITATION POTENTIAL FOR STATED GOALS: GOOD     PLAN : Patient to continue with treatment per plan of care progressing to functional goal achievement utilizing interventions per problem list up to 3 x's per week until goals met or reassessment, or discharge. Regarding Le Flores's therapy, I certify that the treatment plan above will be carried out by a therapist or under their direction. Thank you for this referral,  Staci Joshua PT     Referring Physician Signature: Virgie Rolle MD              Date                    The information in this section was collected on 9/20/2017  (except where otherwise noted). HISTORY:    History of Present Injury/Illness (Reason for Referral)  This kind patient is reporting pain in the lower spine with intermittent spinal parasthesia. She had an MRI which indicated the following findings :    1. At L4-5, there is a focal central disc extrusion resulting in severe central  stenosis. There is also mild left neural foraminal narrowing. 2. Multilevel lumbar spondylosis at additional levels as described. Neurosurgery has opted for physical therapy and ERIC for remission. She is improving overall about 25% but still has quite a bit of pain reported, which has kept her from working recently. She desires to be more active and increase mobility with return to prior function.       Past Medical History/Comorbidities:   Ms. Paolo Garcia  has a past medical history of Anemia; Chronic back pain; Colon polyps (07/26/2017); Depression (9/18/2017); Essential hypertension; GERD (gastroesophageal reflux disease) (8/14/2012); Large Hiatal hernia; Lumbar disc disease (8/18/2017); Obesity, Class I, BMI 30-34.9; Renal cyst, acquired, right; Spondylosis of lumbar joint; and Vitamin D deficiency (5/16/2017). Ms. Mohit Trammell  has a past surgical history that includes manolo and bso; appendectomy; endoscopy (07/26/2017); and colonoscopy (07/26/2017). Social History/Living Environment:     No limitation  Prior Level of Function/Work/Activity:  Had been independent with all functional mobility, currently with limited mobility and ambulation, unable to work at this time. Dominant Side:         RIGHT  Personal Factors:          Sex:  female        Age:  75 y.o. Current Medications:       Current Outpatient Prescriptions:     FLUoxetine (PROZAC) 10 mg capsule, Take 10mg daily for 7 days then increase to 20mg daily (2 caps), Disp: 60 Cap, Rfl: 3    meloxicam (MOBIC) 7.5 mg tablet, Take 1 Tab by mouth two (2) times daily (after meals). , Disp: 60 Tab, Rfl: 2    HYDROcodone-acetaminophen (NORCO)  mg tablet, Take 1/2 to 1 tab three times daily as needed for pain, Disp: 84 Tab, Rfl: 0    metoprolol succinate (TOPROL-XL) 50 mg XL tablet, Take 1 Tab by mouth daily. , Disp: 30 Tab, Rfl: 3    esomeprazole (NEXIUM) 40 mg capsule, TAKE 1 CAPSULE DAILY, Disp: 90 Cap, Rfl: 3    cholecalciferol (VITAMIN D3) 1,000 unit tablet, Take  by mouth daily. , Disp: , Rfl:    Date Last Reviewed:  9/20/2017     OBJECTIVE :      ROM:  TRUNK ROM limited at 66% in all planes of motion and hip extension at 33%    PALPATION :   Palpably tender in the lumbar spine and right SI region.        Strength:  Hip strength limited at bilateral sides at 3+/5 hip extension, abduction, and flexion ,  CORE strength at 3+/5 or less   Neurological Screen:       Dermatomes: intact      Sensation: intact bilateral LE's light touch and pressure        Functional Mobility:       Gait/Ambulation: antalgic with decreased devan and mobility      Transfers:  decreased transfers at slow independent mobility, slightly antalgic    Balance:  Single leg stance at less than 5 seconds each side. Mental Status:  Intact alert, oriented, coherent and lucidity. Number of Personal Factors/Comorbidities that affect the Plan of Care: 1-2: MODERATE COMPLEXITY   EXAMINATION:         Body Functions Affected:  1. Neuromusculoskeletal  2. Movement Related  3. Skin Related  4. Digestive  5. Metobolic/Endocrine Activities and Participation Affected:  1. General Tasks and Demands  2. Mobility  3. Self Care  4. Domestic Life Body Structures Involved:  1. Nerves  2. Bones  3. Joints  4. Muscles   5. Ligaments    Number of elements (examined above) that affect the Plan of Care:  3: MODERATE COMPLEXITY   CLINICAL PRESENTATION:      Presentation: Stable and uncomplicated: LOW COMPLEXITY   CLINICAL DECISION MAKING:    Outcome Measure: Tool Used: Modified Oswestry Low Back Pain Questionnaire  Score:  Initial: 28/50  Most Recent: X/50 (Date: -- )   Interpretation of Score: Each section is scored on a 0-5 scale, 5 representing the greatest disability. The scores of each section are added together for a total score of 50. Score 0 1-10 11-20 21-30 31-40 41-49 50   Modifier CH CI CJ CK CL CM CN     ? Mobility - Walking and Moving Around:     - CURRENT STATUS: CK - 40%-59% impaired, limited or restricted    - GOAL STATUS: CJ - 20%-39% impaired, limited or restricted    - D/C STATUS:  ---------------To be determined---------------      Medical Necessity:   · Patient demonstrates good rehab potential due to higher previous functional level.   Reason for Services/Other Comments:  · Patient continues to require skilled intervention due to medical complications, patient unable to attend/participate in therapy as expected and decreased mobility affecting work and functional status. ·      Use of outcome tool(s) and clinical judgement create a POC that gives a: Clear prediction of patient's progress: LOW COMPLEXITY            TREATMENT:   (In addition to Assessment/Re-Assessment sessions the following treatments were rendered)  Pre-treatment Symptoms/Complaints:   Blood pressure prior to treatment 166/103 and heart rate 81  Blood pressure during nustep 156/96 and hear rate 74  She states blood pressure medication is being adjusted. Pain: Initial:     5/10 in the lower back and the LE's. Post Session:  No change in pain     THERAPEUTIC EXERCISE: (  40 minutes):  Exercises per grid below to improve mobility, strength, balance, coordination and dynamic movement of leg - bilateral to improve functional dynamic mobility . Required minimal visual, verbal, manual and tactile cues to promote proper body alignment, promote proper body posture and promote proper body mechanics. Progressed repetitions as indicated. Date:  9/20/17 Date:     Activity/Exercise Parameters Parameters   Supine with trunk reciprocation  X 10 reps in supine  B    Gluteal activation  Hook lying  X 20 reps     Single knee to chest 3 x 20 second hold B  (DKTC increased groin pain)    Piriformis stretch  3 x 30 second hold B     Transversus abdominis 10 x 5 second hold     Nustep for skilled trunk reciprocation  15 mins level 2     Standing SLR  15 x's flexion, abduction, extension            MODALITIES: (15 minutes) patient supine with knee wedge in place for moist heat to low back to improve blood flow and improve mobility. [facility]  Treatment/Session Assessment:  Patient with some discomfort with some exercises but instructed to perform all exercises in comfortable range. Patient understood and agreeable. Patient given written handouts of HEP. · Response to Treatment:  Improved mobility, monitoring blood pressure as patient continues. · Compliance with Program/Exercises: compliant all of the time. · Recommendations/Intent for next treatment session: \"Next visit will focus on advancements to more challenging activities, reduction in assistance provided and LE mobility and transfers with LB strengthening. \".   Total Treatment Duration:  PT Patient Time In/Time Out  Time In: 0930  Time Out: [address], [address] [facility]

## 2017-09-22 ENCOUNTER — HOSPITAL ENCOUNTER (OUTPATIENT)
Dept: PHYSICAL THERAPY | Age: 67
Discharge: HOME | End: 2017-09-22
Attending: NEUROLOGICAL SURGERY
Payer: MEDICARE

## 2017-09-25 ENCOUNTER — HOSPITAL ENCOUNTER (OUTPATIENT)
Dept: PHYSICAL THERAPY | Age: 67
Discharge: HOME | End: 2017-09-25
Attending: NEUROLOGICAL SURGERY
Payer: MEDICARE

## 2017-09-25 PROCEDURE — 97110 THERAPEUTIC EXERCISES: CPT

## 2017-09-25 NOTE — PROGRESS NOTES
Erich Villasenor  : 1950 [facility], [address] [facility] [place], [address]  [phone]   [phone]         OUTPATIENT PHYSICAL THERAPY:Daily Note 2017    ICD-10: Treatment Diagnosis: M54.5 Low back pain  Precautions/Allergies:   Review of patient's allergies indicates no known allergies. Fall Risk Score: 0 (? 5 = High Risk)  MD Orders: PT evaluation and treatment  MEDICAL/REFERRING DIAGNOSIS:  Spinal stenosis of lumbar region with radiculopathy [M48.06, M54.16]   DATE OF ONSET: Acute on Chronic   REFERRING PHYSICIAN: Penelope Landrum MD  RETURN PHYSICIAN APPOINTMENT: Pending      INITIAL ASSESSMENT:  Ms. Elsy Zuluaga presents with lumbar degenerative changes with L4/L5 extrusion with concurrent movement restriction, lumbar and right gluteal and sacroiliac region with limitations trunk mobility, CORE and proximal LE strength loss. She has been unable to work. Strength is limited hip abduction and hip extension 3+/5. Trunk mobility at 66% except for extension 33% limited. Skilled PT is indicated for her functional mobility deficits and increase CORE stabilization. PROBLEM LIST:  1. Decreased Strength affecting function  2. Decreased ADL/Functional Activities  3. Decreased Flexibility/joint mobility  4. Increased Pain affecting function  5. Decreased Activity tolerance   6. Increased Fatigue affecting function    INTERVENTIONS PLANNED:  1. Balance Exercise  2. Gait Training  3. Home Exercise Program (HEP)  4. Neuromuscular Re-education/Strengthening  5. Therapeutic Activites  6. Therapeutic Exercise/Strengthening  7. Transfer Training  8. Manual Therapy      TREATMENT PLAN: Effective Dates: 2017 TO 2017   Frequency/Duration: up to 3 times a week for 12 weeks  GOALS: (Goals have been discussed and agreed upon with patient.)  DISCHARGE GOALS: Time Frame: 12 weeks   1.  Pt will be compliant and independent with HEP in order to increase lumbar mobility and LE and core strength/endurance to improve quality of life. 2. Pt will reduce score on Modified Oswestry Low Back Pain Questionnaire to 20/50 in order to demonstrate improved functional mobility and quality of life. 3. Pt will report standing for > 60 minutes with minimal to no increase in pain in order to be able to stand for prolonged periods as needed to perform standing for home activities. 4. Patient to demonstrate increased strength in bilateral LE's 1/2 muscle grade. REHABILITATION POTENTIAL FOR STATED GOALS: GOOD     PLAN : Patient to continue with treatment per plan of care progressing to functional goal achievement utilizing interventions per problem list up to 3 x's per week until goals met or reassessment, or discharge. Regarding Le Flores's therapy, I certify that the treatment plan above will be carried out by a therapist or under their direction. Thank you for this referral,  Cally Palma PTA     Referring Physician Signature: Virgie Rolle MD              Date                    The information in this section was collected on 9/25/2017  (except where otherwise noted). HISTORY:    History of Present Injury/Illness (Reason for Referral)  This kind patient is reporting pain in the lower spine with intermittent spinal parasthesia. She had an MRI which indicated the following findings :    1. At L4-5, there is a focal central disc extrusion resulting in severe central  stenosis. There is also mild left neural foraminal narrowing. 2. Multilevel lumbar spondylosis at additional levels as described. Neurosurgery has opted for physical therapy and [facility] for remission. She is improving overall about 25% but still has quite a bit of pain reported, which has kept her from working recently. She desires to be more active and increase mobility with return to prior function.       Past Medical History/Comorbidities:   Ms. Paolo Garcia  has a past medical history of Anemia; Chronic back pain; Colon polyps (07/26/2017); Depression (9/18/2017); Essential hypertension; GERD (gastroesophageal reflux disease) (8/14/2012); Large Hiatal hernia; Lumbar disc disease (8/18/2017); Obesity, Class I, BMI 30-34.9; Renal cyst, acquired, right; Spondylosis of lumbar joint; and Vitamin D deficiency (5/16/2017). Ms. Saida Lau  has a past surgical history that includes manolo and bso; appendectomy; endoscopy (07/26/2017); and colonoscopy (07/26/2017). Social History/Living Environment:     No limitation  Prior Level of Function/Work/Activity:  Had been independent with all functional mobility, currently with limited mobility and ambulation, unable to work at this time. Dominant Side:         RIGHT  Personal Factors:          Sex:  female        Age:  75 y.o. Current Medications:       Current Outpatient Prescriptions:     FLUoxetine (PROZAC) 10 mg capsule, Take 10mg daily for 7 days then increase to 20mg daily (2 caps), Disp: 60 Cap, Rfl: 3    meloxicam (MOBIC) 7.5 mg tablet, Take 1 Tab by mouth two (2) times daily (after meals). , Disp: 60 Tab, Rfl: 2    HYDROcodone-acetaminophen (NORCO)  mg tablet, Take 1/2 to 1 tab three times daily as needed for pain, Disp: 84 Tab, Rfl: 0    metoprolol succinate (TOPROL-XL) 50 mg XL tablet, Take 1 Tab by mouth daily. , Disp: 30 Tab, Rfl: 3    esomeprazole (NEXIUM) 40 mg capsule, TAKE 1 CAPSULE DAILY, Disp: 90 Cap, Rfl: 3    cholecalciferol (VITAMIN D3) 1,000 unit tablet, Take  by mouth daily. , Disp: , Rfl:    Date Last Reviewed:  9/25/2017     OBJECTIVE :      ROM:  TRUNK ROM limited at 66% in all planes of motion and hip extension at 33%    PALPATION :   Palpably tender in the lumbar spine and right SI region.        Strength:  Hip strength limited at bilateral sides at 3+/5 hip extension, abduction, and flexion ,  CORE strength at 3+/5 or less   Neurological Screen:       Dermatomes: intact      Sensation: intact bilateral LE's light touch and pressure        Functional Mobility:       Gait/Ambulation: antalgic with decreased devan and mobility      Transfers:  decreased transfers at slow independent mobility, slightly antalgic    Balance:  Single leg stance at less than 5 seconds each side. Mental Status:  Intact alert, oriented, coherent and lucidity. Number of Personal Factors/Comorbidities that affect the Plan of Care: 1-2: MODERATE COMPLEXITY   EXAMINATION:         Body Functions Affected:  1. Neuromusculoskeletal  2. Movement Related  3. Skin Related  4. Digestive  5. Metobolic/Endocrine Activities and Participation Affected:  1. General Tasks and Demands  2. Mobility  3. Self Care  4. Domestic Life Body Structures Involved:  1. Nerves  2. Bones  3. Joints  4. Muscles   5. Ligaments    Number of elements (examined above) that affect the Plan of Care:  3: MODERATE COMPLEXITY   CLINICAL PRESENTATION:      Presentation: Stable and uncomplicated: LOW COMPLEXITY   CLINICAL DECISION MAKING:    Outcome Measure: Tool Used: Modified Oswestry Low Back Pain Questionnaire  Score:  Initial: 28/50  Most Recent: X/50 (Date: -- )   Interpretation of Score: Each section is scored on a 0-5 scale, 5 representing the greatest disability. The scores of each section are added together for a total score of 50. Score 0 1-10 11-20 21-30 31-40 41-49 50   Modifier CH CI CJ CK CL CM CN     ? Mobility - Walking and Moving Around:     - CURRENT STATUS: CK - 40%-59% impaired, limited or restricted    - GOAL STATUS: CJ - 20%-39% impaired, limited or restricted    - D/C STATUS:  ---------------To be determined---------------      Medical Necessity:   · Patient demonstrates good rehab potential due to higher previous functional level.   Reason for Services/Other Comments:  · Patient continues to require skilled intervention due to medical complications, patient unable to attend/participate in therapy as expected and decreased mobility affecting work and functional status. ·      Use of outcome tool(s) and clinical judgement create a POC that gives a: Clear prediction of patient's progress: LOW COMPLEXITY            TREATMENT:   (In addition to Assessment/Re-Assessment sessions the following treatments were rendered)  Pre-treatment Symptoms/Complaints: Doing about the same. Pain remains the same. Working on exercises at home. Blood pressure prior to treatment 157/92 and heart rate 52  Blood pressure after NuStep 164/98 and heart rate 54  She states blood pressure medication is being adjusted. Pain: Initial:     5-6/10 in the lower back and the LE's. Post Session:  No change in pain     THERAPEUTIC EXERCISE: (  25 minutes):  Exercises per grid below to improve mobility, strength, balance, coordination and dynamic movement of leg - bilateral to improve functional dynamic mobility . Required minimal visual, verbal, manual and tactile cues to promote proper body alignment, promote proper body posture and promote proper body mechanics. Progressed repetitions as indicated. Date:  9/20/17 Date:     Activity/Exercise Parameters Parameters   Supine with trunk reciprocation  X 10 reps in supine  B X 5 reps  X 10 second hold B   Gluteal activation  Hook lying  X 20 reps  Hook lying   x 10 reps with 5 second hold    Single knee to chest 3 x 20 second hold B  (DKTC increased groin pain) D/C due to increased pain    Piriformis stretch  3 x 30 second hold B  Attempted but unable to tolerated   Transversus abdominis 10 x 5 second hold  10 x 5 second hold    Nustep for skilled trunk reciprocation  15 mins level 2  X 13 minutes   Level 2   With moist heat to low back concurrenetly   Standing SLR  15 x's flexion, abduction, extension            MODALITIES: (10 minutes)  Moist heat to low back while patient on NuStep.                          (10 minutes)  Patient supine with knee wedge in place for ice pack to low back and right hip area to help decrease pain and inflammation. [facility] Portal  Treatment/Session Assessment: Patient with decreased tolerance to therapeutic exercises today with increased pain with supine exercises. Patient reported pain increased to 7-8/10 with minimal exercise today. Attempted gentle manual therapy with STM to right low back and upper gluts but patient reported palpable tenderness in this area and in right hip with light to moderate touch in all areas. Patient reports that Dr. James Harrison has scheduled her for an injection on Thursday but is not sure what type of injection or where she is having injection. Continue per plan of care to tolerance. Patient instructed to focus on use of ice at home for low back and right hip and use it several times a day for 10 minutes at a time and patient understood and agreeable. · Response to Treatment:  Improved mobility, monitoring blood pressure as patient continues. · Compliance with Program/Exercises: compliant all of the time. · Recommendations/Intent for next treatment session: \"Next visit will focus on advancements to more challenging activities, reduction in assistance provided and LE mobility and transfers with LB strengthening. \".   Total Treatment Duration:  PT Patient Time In/Time Out  Time In: 0900  Time Out: [address] [facility]

## 2017-09-27 ENCOUNTER — HOSPITAL ENCOUNTER (OUTPATIENT)
Dept: PHYSICAL THERAPY | Age: 67
Discharge: HOME | End: 2017-09-27
Attending: NEUROLOGICAL SURGERY
Payer: MEDICARE

## 2017-09-27 NOTE — PROGRESS NOTES
Therapy Center at [place] [address]  [phone]   [phone]     OUTPATIENT DAILY NOTE     DATE: 9/27/2017    SUBJECTIVE:  Patient called and cancelled for appointment today. Will plan to follow up on next scheduled visit.     Lauren Samayoa, PTA

## 2017-09-29 ENCOUNTER — APPOINTMENT (OUTPATIENT)
Dept: PHYSICAL THERAPY | Age: 67
End: 2017-09-29
Attending: NEUROLOGICAL SURGERY
Payer: MEDICARE

## 2017-10-02 ENCOUNTER — HOSPITAL ENCOUNTER (OUTPATIENT)
Dept: PHYSICAL THERAPY | Age: 67
Discharge: HOME | End: 2017-10-02
Attending: NEUROLOGICAL SURGERY
Payer: MEDICARE

## 2017-10-02 NOTE — PROGRESS NOTES
Therapy Center at [address], [address] [facility]  [phone]   [phone]     OUTPATIENT DAILY NOTE     DATE: 10/2/2017    SUBJECTIVE:  Patient called and cancelled for appointment today due to out of town on family emergency. Will plan to follow up on next scheduled visit.     Jerilyn Modi, PTA

## 2017-10-04 ENCOUNTER — HOSPITAL ENCOUNTER (OUTPATIENT)
Dept: PHYSICAL THERAPY | Age: 67
Discharge: HOME | End: 2017-10-04
Attending: NEUROLOGICAL SURGERY
Payer: MEDICARE

## 2017-10-04 NOTE — PROGRESS NOTES
Patient called and canceled appointment secondary to getting an injection. Gretchen De Guzman, PT, DPT, OCS.

## 2017-10-06 ENCOUNTER — APPOINTMENT (OUTPATIENT)
Dept: PHYSICAL THERAPY | Age: 67
End: 2017-10-06
Attending: NEUROLOGICAL SURGERY
Payer: MEDICARE

## 2017-10-09 ENCOUNTER — HOSPITAL ENCOUNTER (OUTPATIENT)
Dept: PHYSICAL THERAPY | Age: 67
Discharge: HOME | End: 2017-10-09
Attending: NEUROLOGICAL SURGERY
Payer: MEDICARE

## 2017-10-11 ENCOUNTER — HOSPITAL ENCOUNTER (OUTPATIENT)
Dept: PHYSICAL THERAPY | Age: 67
Discharge: HOME | End: 2017-10-11
Attending: NEUROLOGICAL SURGERY
Payer: MEDICARE

## 2017-10-11 NOTE — PROGRESS NOTES
[facility]rapy Center at [place] [address] [facility]  [phone]   [phone]     OUTPATIENT DAILY NOTE     DATE: 10/11/2017    SUBJECTIVE:  Patient cancelled for appointment today due to car trouble. Will plan to follow up on next scheduled visit.     Audra Sapp, PTA

## 2017-10-13 ENCOUNTER — HOSPITAL ENCOUNTER (OUTPATIENT)
Dept: PHYSICAL THERAPY | Age: 67
Discharge: HOME | End: 2017-10-13
Attending: NEUROLOGICAL SURGERY
Payer: MEDICARE

## 2017-10-13 NOTE — PROGRESS NOTES
[facility] Center at [place] [address] [facility]  [phone]   [phone]     OUTPATIENT DAILY NOTE     DATE: 10/13/2017    SUBJECTIVE:  Patient cancelled again for today due to car trouble. Will plan to follow up on next scheduled visit.     Argentina Carrero PTA

## 2017-10-16 ENCOUNTER — HOSPITAL ENCOUNTER (OUTPATIENT)
Dept: PHYSICAL THERAPY | Age: 67
Discharge: HOME | End: 2017-10-16
Attending: NEUROLOGICAL SURGERY
Payer: MEDICARE

## 2017-10-16 PROCEDURE — 97110 THERAPEUTIC EXERCISES: CPT

## 2017-10-16 NOTE — PROGRESS NOTES
Cindy Talley  : 1950 [facility], [facility], [address]  [phone]   [phone]         OUTPATIENT PHYSICAL THERAPY:Daily Note 10/16/2017    ICD-10: Treatment Diagnosis: M54.5 Low back pain  Precautions/Allergies:   Review of patient's allergies indicates no known allergies. Fall Risk Score: 0 (? 5 = High Risk)  MD Orders: PT evaluation and treatment  MEDICAL/REFERRING DIAGNOSIS:  Spinal stenosis of lumbar region with radiculopathy [M48.061, M54.16]   DATE OF ONSET: Acute on Chronic   REFERRING PHYSICIAN: Aminta Carreon MD  RETURN PHYSICIAN APPOINTMENT: Pending      INITIAL ASSESSMENT:  Ms. Aminta Patrick presents with lumbar degenerative changes with L4/L5 extrusion with concurrent movement restriction, lumbar and right gluteal and sacroiliac region with limitations trunk mobility, CORE and proximal LE strength loss. She has been unable to work. Strength is limited hip abduction and hip extension 3+/5. Trunk mobility at 66% except for extension 33% limited. Skilled PT is indicated for her functional mobility deficits and increase CORE stabilization. PROBLEM LIST:  1. Decreased Strength affecting function  2. Decreased ADL/Functional Activities  3. Decreased Flexibility/joint mobility  4. Increased Pain affecting function  5. Decreased Activity tolerance   6. Increased Fatigue affecting function    INTERVENTIONS PLANNED:  1. Balance Exercise  2. Gait Training  3. Home Exercise Program (HEP)  4. Neuromuscular Re-education/Strengthening  5. Therapeutic Activites  6. Therapeutic Exercise/Strengthening  7. Transfer Training  8. Manual Therapy      TREATMENT PLAN: Effective Dates: 2017 TO 2017   Frequency/Duration: up to 3 times a week for 12 weeks  GOALS: (Goals have been discussed and agreed upon with patient.)  DISCHARGE GOALS: Time Frame: 12 weeks   1.  Pt will be compliant and independent with HEP in order to increase lumbar mobility and LE and core strength/endurance to improve quality of life. 2. Pt will reduce score on Modified Oswestry Low Back Pain Questionnaire to 20/50 in order to demonstrate improved functional mobility and quality of life. 3. Pt will report standing for > 60 minutes with minimal to no increase in pain in order to be able to stand for prolonged periods as needed to perform standing for home activities. 4. Patient to demonstrate increased strength in bilateral LE's 1/2 muscle grade. REHABILITATION POTENTIAL FOR STATED GOALS: GOOD     PLAN : Patient to continue with treatment per plan of care progressing to functional goal achievement utilizing interventions per problem list up to 3 x's per week until goals met or reassessment, or discharge. Regarding Buck Flores's therapy, I certify that the treatment plan above will be carried out by a therapist or under their direction. Thank you for this referral,  Adan Grissom PTA     Referring Physician Signature: Ulysses Clifton MD              Date                    The information in this section was collected on 10/16/2017  (except where otherwise noted). HISTORY:    History of Present Injury/Illness (Reason for Referral)  This kind patient is reporting pain in the lower spine with intermittent spinal parasthesia. She had an MRI which indicated the following findings :    1. At L4-5, there is a focal central disc extrusion resulting in severe central  stenosis. There is also mild left neural foraminal narrowing. 2. Multilevel lumbar spondylosis at additional levels as described. Neurosurgery has opted for physical therapy and [facility] for remission. She is improving overall about 25% but still has quite a bit of pain reported, which has kept her from working recently. She desires to be more active and increase mobility with return to prior function.       Past Medical History/Comorbidities:   Ms. Mary Grace Cannon  has a past medical history of Anemia; Chronic back pain; Colon polyps (07/26/2017); Depression (9/18/2017); Essential hypertension; GERD (gastroesophageal reflux disease) (8/14/2012); Large Hiatal hernia; Lumbar disc disease (8/18/2017); Obesity, Class I, BMI 30-34.9; Renal cyst, acquired, right; Spondylosis of lumbar joint; and Vitamin D deficiency (5/16/2017). Ms. José Luis Schwartz  has a past surgical history that includes maonlo and bso; appendectomy; endoscopy (07/26/2017); and colonoscopy (07/26/2017). Social History/Living Environment:     No limitation  Prior Level of Function/Work/Activity:  Had been independent with all functional mobility, currently with limited mobility and ambulation, unable to work at this time. Dominant Side:         RIGHT  Personal Factors:          Sex:  female        Age:  75 y.o. Current Medications:       Current Outpatient Prescriptions:     HYDROcodone-acetaminophen (NORCO)  mg tablet, Take 1/2 to 1 tab three times daily as needed for pain, Disp: 84 Tab, Rfl: 0    metoprolol succinate (TOPROL-XL) 100 mg tablet, Take 1 Tab by mouth daily. , Disp: 30 Tab, Rfl: 3    FLUoxetine (PROZAC) 10 mg capsule, Take 10mg daily for 7 days then increase to 20mg daily (2 caps), Disp: 60 Cap, Rfl: 3    meloxicam (MOBIC) 7.5 mg tablet, Take 1 Tab by mouth two (2) times daily (after meals). , Disp: 60 Tab, Rfl: 2    esomeprazole (NEXIUM) 40 mg capsule, TAKE 1 CAPSULE DAILY, Disp: 90 Cap, Rfl: 3    cholecalciferol (VITAMIN D3) 1,000 unit tablet, Take  by mouth daily. , Disp: , Rfl:    Date Last Reviewed:  10/16/2017     OBJECTIVE :      ROM:  TRUNK ROM limited at 66% in all planes of motion and hip extension at 33%    PALPATION :   Palpably tender in the lumbar spine and right SI region.        Strength:  Hip strength limited at bilateral sides at 3+/5 hip extension, abduction, and flexion ,  CORE strength at 3+/5 or less   Neurological Screen:       Dermatomes: intact      Sensation: intact bilateral LE's light touch and pressure        Functional Mobility:       Gait/Ambulation: antalgic with decreased devan and mobility      Transfers:  decreased transfers at slow independent mobility, slightly antalgic    Balance:  Single leg stance at less than 5 seconds each side. Mental Status:  Intact alert, oriented, coherent and lucidity. Number of Personal Factors/Comorbidities that affect the Plan of Care: 1-2: MODERATE COMPLEXITY   EXAMINATION:         Body Functions Affected:  1. Neuromusculoskeletal  2. Movement Related  3. Skin Related  4. Digestive  5. Metobolic/Endocrine Activities and Participation Affected:  1. General Tasks and Demands  2. Mobility  3. Self Care  4. Domestic Life Body Structures Involved:  1. Nerves  2. Bones  3. Joints  4. Muscles   5. Ligaments    Number of elements (examined above) that affect the Plan of Care:  3: MODERATE COMPLEXITY   CLINICAL PRESENTATION:      Presentation: Stable and uncomplicated: LOW COMPLEXITY   CLINICAL DECISION MAKING:    Outcome Measure: Tool Used: Modified Oswestry Low Back Pain Questionnaire  Score:  Initial: 28/50  Most Recent: X/50 (Date: -- )   Interpretation of Score: Each section is scored on a 0-5 scale, 5 representing the greatest disability. The scores of each section are added together for a total score of 50. Score 0 1-10 11-20 21-30 31-40 41-49 50   Modifier CH CI CJ CK CL CM CN     ? Mobility - Walking and Moving Around:     - CURRENT STATUS: CK - 40%-59% impaired, limited or restricted    - GOAL STATUS: CJ - 20%-39% impaired, limited or restricted    - D/C STATUS:  ---------------To be determined---------------      Medical Necessity:   · Patient demonstrates good rehab potential due to higher previous functional level.   Reason for Services/Other Comments:  · Patient continues to require skilled intervention due to medical complications, patient unable to attend/participate in therapy as expected and decreased mobility affecting work and functional status. ·      Use of outcome tool(s) and clinical judgement create a POC that gives a: Clear prediction of patient's progress: LOW COMPLEXITY            TREATMENT:   (In addition to Assessment/Re-Assessment sessions the following treatments were rendered)  Pre-treatment Symptoms/Complaints: Patient reports she has been having car issues, but now it is fixed. She reports the pain is starting to return after the shot 2 weeks ago. Doing very little exercise at home. Blood pressure prior to treatment 152/92 after Nustep. Blood pressure at end of session 142/87        Pain: Initial:     3-4/10 in the lower back and the LE's. Post Session: She reports pain to be 3/10 with less tightness. THERAPEUTIC EXERCISE: (  45 minutes):  Exercises per grid below to improve mobility, strength, balance, coordination and dynamic movement of leg - bilateral to improve functional dynamic mobility . Required minimal visual, verbal, manual and tactile cues to promote proper body alignment, promote proper body posture and promote proper body mechanics. Progressed repetitions as indicated.    Date:  9/20/17 Date:   10-16-17   Activity/Exercise Parameters Parameters    Supine with trunk reciprocation  X 10 reps in supine  B X 5 reps  X 10 second hold B 10 x 10 sec hold B    Gluteal activation  Hook lying  X 20 reps  Hook lying   x 10 reps with 5 second hold  With legs on bolster   2 x 10    Single knee to chest 3 x 20 second hold B  (DKTC increased groin pain) D/C due to increased pain  3 x 15 sec hold B  To pain on L   Piriformis stretch  3 x 30 second hold B  Attempted but unable to tolerated    Transversus abdominis 10 x 5 second hold  10 x 5 second hold     Nustep for skilled trunk reciprocation  15 mins level 2  X 13 minutes   Level 2   With moist heat to low back concurrenetly 15 minutes   Level 1    No moist heat today   Standing SLR  15 x's flexion, abduction, extension   X 10 B  Flexion, extension, and abduction   Hamstring  Stretch    Gently with strap while supine   3 x 15 sec hold B     MODALITIES: ( minutes)  Moist heat to low back while patient on NuStep. ( minutes)  Patient supine with knee wedge in place for ice pack to low back and right hip area to help decrease pain and inflammation. [facility]  Treatment/Session Assessment: Patient with lots of verbal cues to breathe during her exercises. She tried most of the exercises today with a few modification due to pain. She scheduled an appointment for Thursday. She reports she is to see the pain doctor on Wednesday or Thursday. Told patient that if she gets an injection from the pain doctor on Wednesday or Thursday to call and cancel and reschedule her PT appointment. She is to see Dr. Michael Mckay on the 25th. Continue per plan of care to tolerance. Patient encouraged use of cold and heat at home. · Response to Treatment:  Improved mobility, monitoring blood pressure as patient continues. · Compliance with Program/Exercises: compliant all of the time. · Recommendations/Intent for next treatment session: \"Next visit will focus on advancements to more challenging activities, reduction in assistance provided and LE mobility and transfers with LB strengthening. \".   Total Treatment Duration: 45 minutes   PT Patient Time In/Time Out  Time In: 0910  Time Out: 7 [facility]

## 2017-10-19 ENCOUNTER — HOSPITAL ENCOUNTER (OUTPATIENT)
Dept: PHYSICAL THERAPY | Age: 67
Discharge: HOME | End: 2017-10-19
Attending: NEUROLOGICAL SURGERY
Payer: MEDICARE

## 2017-11-21 PROBLEM — I42.2 HYPERTROPHIC CARDIOMYOPATHY (HCC): Status: ACTIVE | Noted: 2017-11-21

## 2017-12-20 ENCOUNTER — HOSPITAL ENCOUNTER (OUTPATIENT)
Dept: SURGERY | Age: 67
Discharge: HOME | End: 2017-12-20
Payer: MEDICARE

## 2017-12-20 VITALS
SYSTOLIC BLOOD PRESSURE: 174 MMHG | HEART RATE: 58 BPM | BODY MASS INDEX: 34.75 KG/M2 | DIASTOLIC BLOOD PRESSURE: 78 MMHG | HEIGHT: 67 IN | OXYGEN SATURATION: 98 % | TEMPERATURE: 98.4 F | RESPIRATION RATE: 16 BRPM | WEIGHT: 221.44 LBS

## 2017-12-20 LAB
BACTERIA SPEC CULT: NORMAL
HGB BLD-MCNC: 12.1 G/DL (ref 11.7–15.4)
SERVICE CMNT-IMP: NORMAL

## 2017-12-20 PROCEDURE — 85018 HEMOGLOBIN: CPT | Performed by: NEUROLOGICAL SURGERY

## 2017-12-20 PROCEDURE — 87641 MR-STAPH DNA AMP PROBE: CPT | Performed by: NEUROLOGICAL SURGERY

## 2017-12-20 RX ORDER — DULOXETIN HYDROCHLORIDE 30 MG/1
30 CAPSULE, DELAYED RELEASE ORAL DAILY
COMMUNITY
End: 2018-06-05

## 2017-12-20 NOTE — PERIOP NOTES
Patient verified name, , and surgery as listed in [facility]. Patient provided medical/health information and PTA medications to the best of their ability. TYPE  CASE: ll  Orders per surgeon: were  Received and dated for 2017  Labs per surgeon: hgb collected and results are in [facility]. Labs per anesthesia protocol: no additional.   EKG:  EKG from 2017 placed on chart Patient denies any chest pain or shortness of breath on exertion. Echo from 2017 and last cardiac office note from 2017 also placed on chart     Nasal Swab collected per MD order and instructions for Mupirocin nasal ointment if required. Patient provided with and instructed on education handouts including Guide to Surgery, blood transfusions, pain management, and hand hygiene for the family and community, and [facility] brochure. Long handled prehab sponge given with instructions for use. Hibiclens, antibacterial soap and instructions given per hospital policy. Instructed patient to continue previous medications as prescribed prior to surgery unless otherwise directed and to take the following medications the day of surgery according to anesthesia guidelines : Duloxetine, Nexium, Gabapentin, Hydrocodone if needed and Metoprolol     . Instructed patient to hold  the following medications on the day of surgery:all vitamins and supplements 7 days prior to surgery including Vitamin D3 and all nsaids 5 days prior to surgery including motrin,advil,aleve and ibuprofen    Original medication prescription bottles were visualized during patient appointment. Patient teach back successful and patient demonstrates knowledge of instruction.

## 2017-12-25 ENCOUNTER — ANESTHESIA EVENT (OUTPATIENT)
Dept: SURGERY | Age: 67
End: 2017-12-25
Payer: MEDICARE

## 2017-12-26 ENCOUNTER — APPOINTMENT (OUTPATIENT)
Dept: GENERAL RADIOLOGY | Age: 67
End: 2017-12-26
Attending: NEUROLOGICAL SURGERY
Payer: MEDICARE

## 2017-12-26 ENCOUNTER — HOSPITAL ENCOUNTER (OUTPATIENT)
Age: 67
Setting detail: OUTPATIENT SURGERY
Discharge: HOME | End: 2017-12-26
Attending: NEUROLOGICAL SURGERY | Admitting: NEUROLOGICAL SURGERY
Payer: MEDICARE

## 2017-12-26 ENCOUNTER — ANESTHESIA (OUTPATIENT)
Dept: SURGERY | Age: 67
End: 2017-12-26
Payer: MEDICARE

## 2017-12-26 VITALS
WEIGHT: 221.6 LBS | SYSTOLIC BLOOD PRESSURE: 177 MMHG | OXYGEN SATURATION: 90 % | TEMPERATURE: 98 F | DIASTOLIC BLOOD PRESSURE: 68 MMHG | HEART RATE: 56 BPM | RESPIRATION RATE: 14 BRPM | BODY MASS INDEX: 34.78 KG/M2 | HEIGHT: 67 IN

## 2017-12-26 DIAGNOSIS — M54.32 SCIATICA OF LEFT SIDE: Primary | ICD-10-CM

## 2017-12-26 PROCEDURE — 77030011640 HC PAD GRND REM COVD -A: Performed by: NEUROLOGICAL SURGERY

## 2017-12-26 PROCEDURE — 77030019908 HC STETH ESOPH SIMS -A: Performed by: ANESTHESIOLOGY

## 2017-12-26 PROCEDURE — 77030026224 HC KNF DSCTMY MTRX MEDT -D: Performed by: NEUROLOGICAL SURGERY

## 2017-12-26 PROCEDURE — 77030019940 HC BLNKT HYPOTHRM STRY -B: Performed by: ANESTHESIOLOGY

## 2017-12-26 PROCEDURE — 74011250637 HC RX REV CODE- 250/637: Performed by: ANESTHESIOLOGY

## 2017-12-26 PROCEDURE — 76010000171 HC OR TIME 2 TO 2.5 HR INTENSV-TIER 1: Performed by: NEUROLOGICAL SURGERY

## 2017-12-26 PROCEDURE — 77030032490 HC SLV COMPR SCD KNE COVD -B: Performed by: NEUROLOGICAL SURGERY

## 2017-12-26 PROCEDURE — 77030018836 HC SOL IRR NACL ICUM -A: Performed by: NEUROLOGICAL SURGERY

## 2017-12-26 PROCEDURE — 74011000250 HC RX REV CODE- 250

## 2017-12-26 PROCEDURE — 74011000250 HC RX REV CODE- 250: Performed by: NEUROLOGICAL SURGERY

## 2017-12-26 PROCEDURE — 77030008477 HC STYL SATN SLP COVD -A: Performed by: ANESTHESIOLOGY

## 2017-12-26 PROCEDURE — 77030003029 HC SUT VCRL J&J -B: Performed by: NEUROLOGICAL SURGERY

## 2017-12-26 PROCEDURE — 74011250636 HC RX REV CODE- 250/636

## 2017-12-26 PROCEDURE — 74011250636 HC RX REV CODE- 250/636: Performed by: ANESTHESIOLOGY

## 2017-12-26 PROCEDURE — 77030014007 HC SPNG HEMSTAT J&J -B: Performed by: NEUROLOGICAL SURGERY

## 2017-12-26 PROCEDURE — 77030020782 HC GWN BAIR PAWS FLX 3M -B: Performed by: ANESTHESIOLOGY

## 2017-12-26 PROCEDURE — 77030003666 HC NDL SPINAL BD -A: Performed by: NEUROLOGICAL SURGERY

## 2017-12-26 PROCEDURE — 76060000035 HC ANESTHESIA 2 TO 2.5 HR: Performed by: NEUROLOGICAL SURGERY

## 2017-12-26 PROCEDURE — 77030008703 HC TU ET UNCUF COVD -A: Performed by: ANESTHESIOLOGY

## 2017-12-26 PROCEDURE — 77030030163 HC BN WAX J&J -A: Performed by: NEUROLOGICAL SURGERY

## 2017-12-26 PROCEDURE — 77030010507 HC ADH SKN DERMBND J&J -B: Performed by: NEUROLOGICAL SURGERY

## 2017-12-26 PROCEDURE — 77030012894: Performed by: NEUROLOGICAL SURGERY

## 2017-12-26 PROCEDURE — 77030004391 HC BUR FLUT MEDT -C: Performed by: NEUROLOGICAL SURGERY

## 2017-12-26 PROCEDURE — 74011250636 HC RX REV CODE- 250/636: Performed by: NEUROLOGICAL SURGERY

## 2017-12-26 PROCEDURE — 76210000017 HC OR PH I REC 1.5 TO 2 HR: Performed by: NEUROLOGICAL SURGERY

## 2017-12-26 PROCEDURE — 76210000020 HC REC RM PH II FIRST 0.5 HR: Performed by: NEUROLOGICAL SURGERY

## 2017-12-26 RX ORDER — ACETAMINOPHEN 10 MG/ML
INJECTION, SOLUTION INTRAVENOUS AS NEEDED
Status: DISCONTINUED | OUTPATIENT
Start: 2017-12-26 | End: 2017-12-26 | Stop reason: HOSPADM

## 2017-12-26 RX ORDER — LIDOCAINE HYDROCHLORIDE 10 MG/ML
0.1 INJECTION INFILTRATION; PERINEURAL AS NEEDED
Status: DISCONTINUED | OUTPATIENT
Start: 2017-12-26 | End: 2017-12-26 | Stop reason: HOSPADM

## 2017-12-26 RX ORDER — FLUMAZENIL 0.1 MG/ML
0.2 INJECTION INTRAVENOUS
Status: DISCONTINUED | OUTPATIENT
Start: 2017-12-26 | End: 2017-12-26 | Stop reason: HOSPADM

## 2017-12-26 RX ORDER — OXYCODONE HYDROCHLORIDE 5 MG/1
5 TABLET ORAL
Status: COMPLETED | OUTPATIENT
Start: 2017-12-26 | End: 2017-12-26

## 2017-12-26 RX ORDER — CEFAZOLIN SODIUM 1 G/3ML
INJECTION, POWDER, FOR SOLUTION INTRAMUSCULAR; INTRAVENOUS AS NEEDED
Status: DISCONTINUED | OUTPATIENT
Start: 2017-12-26 | End: 2017-12-26 | Stop reason: HOSPADM

## 2017-12-26 RX ORDER — LIDOCAINE HYDROCHLORIDE AND EPINEPHRINE 10; 10 MG/ML; UG/ML
INJECTION, SOLUTION INFILTRATION; PERINEURAL AS NEEDED
Status: DISCONTINUED | OUTPATIENT
Start: 2017-12-26 | End: 2017-12-26 | Stop reason: HOSPADM

## 2017-12-26 RX ORDER — SODIUM CHLORIDE 0.9 % (FLUSH) 0.9 %
5-10 SYRINGE (ML) INJECTION AS NEEDED
Status: DISCONTINUED | OUTPATIENT
Start: 2017-12-26 | End: 2017-12-26 | Stop reason: HOSPADM

## 2017-12-26 RX ORDER — CEFAZOLIN SODIUM/WATER 2 G/20 ML
2 SYRINGE (ML) INTRAVENOUS ONCE
Status: COMPLETED | OUTPATIENT
Start: 2017-12-26 | End: 2017-12-26

## 2017-12-26 RX ORDER — ONDANSETRON 2 MG/ML
INJECTION INTRAMUSCULAR; INTRAVENOUS AS NEEDED
Status: DISCONTINUED | OUTPATIENT
Start: 2017-12-26 | End: 2017-12-26 | Stop reason: HOSPADM

## 2017-12-26 RX ORDER — MIDAZOLAM HYDROCHLORIDE 1 MG/ML
2 INJECTION, SOLUTION INTRAMUSCULAR; INTRAVENOUS
Status: DISCONTINUED | OUTPATIENT
Start: 2017-12-26 | End: 2017-12-26 | Stop reason: HOSPADM

## 2017-12-26 RX ORDER — HYDROMORPHONE HYDROCHLORIDE 2 MG/ML
0.5 INJECTION, SOLUTION INTRAMUSCULAR; INTRAVENOUS; SUBCUTANEOUS
Status: DISCONTINUED | OUTPATIENT
Start: 2017-12-26 | End: 2017-12-26 | Stop reason: HOSPADM

## 2017-12-26 RX ORDER — NALOXONE HYDROCHLORIDE 0.4 MG/ML
0.2 INJECTION, SOLUTION INTRAMUSCULAR; INTRAVENOUS; SUBCUTANEOUS AS NEEDED
Status: DISCONTINUED | OUTPATIENT
Start: 2017-12-26 | End: 2017-12-26 | Stop reason: HOSPADM

## 2017-12-26 RX ORDER — OXYCODONE AND ACETAMINOPHEN 5; 325 MG/1; MG/1
TABLET ORAL
Qty: 40 TAB | Refills: 0 | Status: SHIPPED | OUTPATIENT
Start: 2017-12-26 | End: 2018-01-11

## 2017-12-26 RX ORDER — SODIUM CHLORIDE, SODIUM LACTATE, POTASSIUM CHLORIDE, CALCIUM CHLORIDE 600; 310; 30; 20 MG/100ML; MG/100ML; MG/100ML; MG/100ML
100 INJECTION, SOLUTION INTRAVENOUS CONTINUOUS
Status: DISCONTINUED | OUTPATIENT
Start: 2017-12-26 | End: 2017-12-26 | Stop reason: HOSPADM

## 2017-12-26 RX ORDER — FENTANYL CITRATE 50 UG/ML
INJECTION, SOLUTION INTRAMUSCULAR; INTRAVENOUS AS NEEDED
Status: DISCONTINUED | OUTPATIENT
Start: 2017-12-26 | End: 2017-12-26 | Stop reason: HOSPADM

## 2017-12-26 RX ORDER — PROPOFOL 10 MG/ML
INJECTION, EMULSION INTRAVENOUS AS NEEDED
Status: DISCONTINUED | OUTPATIENT
Start: 2017-12-26 | End: 2017-12-26 | Stop reason: HOSPADM

## 2017-12-26 RX ORDER — ACETAMINOPHEN 500 MG
1000 TABLET ORAL ONCE
Status: DISCONTINUED | OUTPATIENT
Start: 2017-12-26 | End: 2017-12-26 | Stop reason: HOSPADM

## 2017-12-26 RX ORDER — NEOSTIGMINE METHYLSULFATE 1 MG/ML
INJECTION INTRAVENOUS AS NEEDED
Status: DISCONTINUED | OUTPATIENT
Start: 2017-12-26 | End: 2017-12-26 | Stop reason: HOSPADM

## 2017-12-26 RX ORDER — LIDOCAINE HYDROCHLORIDE 20 MG/ML
INJECTION, SOLUTION EPIDURAL; INFILTRATION; INTRACAUDAL; PERINEURAL AS NEEDED
Status: DISCONTINUED | OUTPATIENT
Start: 2017-12-26 | End: 2017-12-26 | Stop reason: HOSPADM

## 2017-12-26 RX ORDER — AMOXICILLIN 250 MG
2 CAPSULE ORAL DAILY
Qty: 30 TAB | Refills: 1 | Status: SHIPPED | OUTPATIENT
Start: 2017-12-26 | End: 2018-08-04 | Stop reason: CLARIF

## 2017-12-26 RX ORDER — FENTANYL CITRATE 50 UG/ML
100 INJECTION, SOLUTION INTRAMUSCULAR; INTRAVENOUS ONCE
Status: DISCONTINUED | OUTPATIENT
Start: 2017-12-26 | End: 2017-12-26 | Stop reason: HOSPADM

## 2017-12-26 RX ORDER — DIPHENHYDRAMINE HYDROCHLORIDE 50 MG/ML
12.5 INJECTION, SOLUTION INTRAMUSCULAR; INTRAVENOUS
Status: DISCONTINUED | OUTPATIENT
Start: 2017-12-26 | End: 2017-12-26 | Stop reason: HOSPADM

## 2017-12-26 RX ORDER — ROCURONIUM BROMIDE 10 MG/ML
INJECTION, SOLUTION INTRAVENOUS AS NEEDED
Status: DISCONTINUED | OUTPATIENT
Start: 2017-12-26 | End: 2017-12-26 | Stop reason: HOSPADM

## 2017-12-26 RX ORDER — OXYCODONE HYDROCHLORIDE 5 MG/1
10 TABLET ORAL
Status: DISCONTINUED | OUTPATIENT
Start: 2017-12-26 | End: 2017-12-26 | Stop reason: HOSPADM

## 2017-12-26 RX ORDER — MIDAZOLAM HYDROCHLORIDE 1 MG/ML
2 INJECTION, SOLUTION INTRAMUSCULAR; INTRAVENOUS ONCE
Status: DISCONTINUED | OUTPATIENT
Start: 2017-12-26 | End: 2017-12-26 | Stop reason: HOSPADM

## 2017-12-26 RX ORDER — SODIUM CHLORIDE 0.9 % (FLUSH) 0.9 %
5-10 SYRINGE (ML) INJECTION EVERY 8 HOURS
Status: DISCONTINUED | OUTPATIENT
Start: 2017-12-26 | End: 2017-12-26 | Stop reason: HOSPADM

## 2017-12-26 RX ORDER — GLYCOPYRROLATE 0.2 MG/ML
INJECTION INTRAMUSCULAR; INTRAVENOUS AS NEEDED
Status: DISCONTINUED | OUTPATIENT
Start: 2017-12-26 | End: 2017-12-26 | Stop reason: HOSPADM

## 2017-12-26 RX ADMIN — SODIUM CHLORIDE, SODIUM LACTATE, POTASSIUM CHLORIDE, AND CALCIUM CHLORIDE: 600; 310; 30; 20 INJECTION, SOLUTION INTRAVENOUS at 13:23

## 2017-12-26 RX ADMIN — GLYCOPYRROLATE 0.4 MG: 0.2 INJECTION INTRAMUSCULAR; INTRAVENOUS at 13:53

## 2017-12-26 RX ADMIN — FENTANYL CITRATE 100 MCG: 50 INJECTION, SOLUTION INTRAMUSCULAR; INTRAVENOUS at 12:06

## 2017-12-26 RX ADMIN — OXYCODONE HYDROCHLORIDE 5 MG: 5 TABLET ORAL at 14:58

## 2017-12-26 RX ADMIN — ACETAMINOPHEN 1000 MG: 10 INJECTION, SOLUTION INTRAVENOUS at 13:44

## 2017-12-26 RX ADMIN — ROCURONIUM BROMIDE 50 MG: 10 INJECTION, SOLUTION INTRAVENOUS at 12:07

## 2017-12-26 RX ADMIN — PROPOFOL 150 MG: 10 INJECTION, EMULSION INTRAVENOUS at 12:06

## 2017-12-26 RX ADMIN — SODIUM CHLORIDE, SODIUM LACTATE, POTASSIUM CHLORIDE, AND CALCIUM CHLORIDE 100 ML/HR: 600; 310; 30; 20 INJECTION, SOLUTION INTRAVENOUS at 10:11

## 2017-12-26 RX ADMIN — LIDOCAINE HYDROCHLORIDE 80 MG: 20 INJECTION, SOLUTION EPIDURAL; INFILTRATION; INTRACAUDAL; PERINEURAL at 12:06

## 2017-12-26 RX ADMIN — HYDROMORPHONE HYDROCHLORIDE 0.5 MG: 2 INJECTION, SOLUTION INTRAMUSCULAR; INTRAVENOUS; SUBCUTANEOUS at 14:27

## 2017-12-26 RX ADMIN — Medication 2 G: at 12:23

## 2017-12-26 RX ADMIN — HYDROMORPHONE HYDROCHLORIDE 0.5 MG: 2 INJECTION, SOLUTION INTRAMUSCULAR; INTRAVENOUS; SUBCUTANEOUS at 14:21

## 2017-12-26 RX ADMIN — ONDANSETRON 4 MG: 2 INJECTION INTRAMUSCULAR; INTRAVENOUS at 13:31

## 2017-12-26 RX ADMIN — NEOSTIGMINE METHYLSULFATE 3 MG: 1 INJECTION INTRAVENOUS at 13:53

## 2017-12-26 NOTE — IP AVS SNAPSHOT
Souleymane Romero 
 
 
 [address] [facility] [address] 
[phone] Patient: Mars Barrera MRN: [id number] EMQ:[id number] About your hospitalization You were admitted on:  December 26, 2017 You last received care in the:  [facility] PACU You were discharged on:  December 26, 2017 Why you were hospitalized Your primary diagnosis was:  Not on File Things You Need To Do (next 8 weeks) Follow up with Airam Castellon MD  
  
Phone:  [phone] Where:  [address], Jazmin Tavera [address] Tuesday Jan 09, 2018 WOUND CHECK with [facility] NURSE at  9:00 AM  
Where:  [facility] ([facility]) Call Tae Martell MD  
follow up at 9 AM  
  
Phone:  [phone] Where:  [address] [facility], [address], [place] [address] Discharge Orders None A check jane indicates which time of day the medication should be taken. My Medications STOP taking these medications HYDROcodone-acetaminophen  mg tablet Commonly known as:  [address] these medications as instructed Instructions Each Dose to Equal  
 Morning Noon Evening Bedtime CYMBALTA 30 mg capsule Generic drug:  DULoxetine Your last dose was: Your next dose is: Take 30 mg by mouth daily. 30 mg  
    
   
   
   
  
 esomeprazole 40 mg capsule Commonly known as:  NexIUM Your last dose was: Your next dose is: TAKE 1 CAPSULE DAILY  
     
   
   
   
  
 gabapentin 300 mg capsule Commonly known as:  NEURONTIN Your last dose was: Your next dose is:    
   
   
 300 mg three (3) times daily. Indications: NEUROPATHIC PAIN  
 300 mg  
    
   
   
   
  
 metoprolol succinate 100 mg tablet Commonly known as:  TOPROL-XL Your last dose was: Your next dose is: Take 1 Tab by mouth daily. 100 mg  
    
   
   
   
  
 oxyCODONE-acetaminophen 5-325 mg per tablet Commonly known as:  PERCOCET Your last dose was: Your next dose is:    
   
   
 1-2 tabs po q4-6h prn pain  
     
   
   
   
  
 senna-docusate 8.6-50 mg per tablet Commonly known as:  Master العلي Your last dose was: Your next dose is: Take 2 Tabs by mouth daily. 2 Tab  
    
   
   
   
  
 sodium chloride 0.65 % nasal spray Commonly known as:  OCEAN Your last dose was: Your next dose is:    
   
   
 1 Spray by Both Nostrils route four (4) times daily as needed for Congestion. 1 Spray VITAMIN D3 1,000 unit tablet Generic drug:  cholecalciferol Your last dose was: Your next dose is: Take  by mouth daily. Where to Get Your Medications Information on where to get these meds will be given to you by the nurse or doctor. ! Ask your nurse or doctor about these medications  
  oxyCODONE-acetaminophen 5-325 mg per tablet  
 senna-docusate 8.6-50 mg per tablet Discharge Instructions [facility] 
[address] [place] [address], [address] 
[phone] Postoperative Home Instructions Lumbar (Back) Surgery · Showering: You may shower the first day you are home with the dressing on. If the dressing becomes saturated, change it completely to a similar dressing. Leave the steri-strips or glue in place. After 3 days, you may take the dressing off and leave it off. If you have the brown aquacel dressing, leave it alone for 5 days and then you may remove the dressing. Do not soak in a tub, and use only soap and water on the wound. · Wound Care: A small to moderate amount of reddish drainage on the dressing is normal the first 1-2 days after surgery.   If the dressing becomes saturated, change it. Once the steri-strips begin to peel up on their own, you may gently take them off. Large amounts of clear, watery drainage is not normal and you should call our office immediately. · Signs of Infection: Extreme tenderness at the wound, excessive redness and/or swelling, or ugly yellowish-greenish drainage from the wound. Fever greater than 100.5 may be present. If you think you have a wound infection, call our office immediately. · Driving: You may not begin driving until after your visit to our office for a wound and suture check which is normally 7-10 days after you come home from the hospital. 
 
· Medications: You should take anti-inflammatory medications such as Motrin, Celebrex for 30 days after surgery, every day, on a regular schedule only if prescribed by your physician. The pain medicine prescribed may be taken as needed. You should take a stool softener (Colace) twice a day, drink lots of water and eat high fiber foods to avoid constipation (this is a common problem with pain medicine.) · Deep Breathing Exercises: Continue to do your incentive spirometry and/or deep breathing exercises to prevent risk of pneumonia. · Smoking: YOU MAY NOT SMOKE! Smoking will interfere with your healing. If you smoke, you may end up with having another surgery or more problems! · Activity: No heavy lifting for 4 weeks after surgery. This means anything heavier than a coffee cup or newspaper. After 4 weeks, you may gradually begin lifting heavier things. Avoid bending, stooping, or twisting at the waist.  Do not lie on your stomach to sleep. · You may remove your [place] hose when consistently walking. You may do steps and inclines in moderation. · Sexual Relations: You may resume sexual relations 2 weeks after your surgery.  
 
· Walking Program: You should begin walking every day on the first day after surgery. Start for short distances, then go a little farther each day. You should eventually walk 1-2 miles every day for the long term. This is very important in your recovery period because walking strengthens the spinal muscles and will help protect your disc and vertebrae. · Symptoms after Surgery: Dont be alarmed if you still have some symptoms after surgery. The nerves often require time to heal after the pressure has been taken off. Be patient, you should see improvement with time. · Follow-up: You will need to call our office for an appointment to see a nurse one week after surgery for a wound check. An appointment will be made then for you to see your surgeon about 4 weeks after surgery. Please call our office if you have any other questions or problems. Listen to your body; it will tell you if you are overdoing it. Use common sense and take care of yourself! After general anesthesia or intravenous sedation, for 24 hours or while taking prescription Narcotics: · Limit your activities · Do not drive and operate hazardous machinery · Do not make important personal or business decisions · Do  not drink alcoholic beverages · If you have not urinated within 8 hours after discharge, please contact your surgeon on call. *  Please give a list of your current medications to your Primary Care Provider. *  Please update this list whenever your medications are discontinued, doses are 
    changed, or new medications (including over-the-counter products) are added. *  Please carry medication information at all times in case of emergency situations. These are general instructions for a healthy lifestyle: No smoking/ No tobacco products/ Avoid exposure to second hand smoke Surgeon General's Warning:  Quitting smoking now greatly reduces serious risk to your health. Obesity, smoking, and sedentary lifestyle greatly increases your risk for illness A healthy diet, regular physical exercise & weight monitoring are important for maintaining a healthy lifestyle You may be retaining fluid if you have a history of heart failure or if you experience any of the following symptoms:  Weight gain of 3 pounds or more overnight or 5 pounds in a week, increased swelling in our hands or feet or shortness of breath while lying flat in bed. Please call your doctor as soon as you notice any of these symptoms; do not wait until your next office visit. Recognize signs and symptoms of STROKE: 
F-face looks uneven A-arms unable to move or move unevenly S-speech slurred or non-existent T-time-call 911 as soon as signs and symptoms begin-DO NOT go Back to bed or wait to see if you get better-TIME IS BRAIN. ACO Transitions of Care Introducing [employer] [address] Kim offers a voluntary care coordination program to provide high quality service and care to [facility] fee-for-service beneficiaries. Tyrel Hoover was designed to help you enhance your health and well-being through the following services: ? Transitions of Care  support for individuals who are transitioning from one care setting to another (example: Hospital to home). ? Chronic and Complex Care Coordination  support for individuals and caregivers of those with serious or chronic illnesses or with more than one chronic (ongoing) condition and those who take a number of different medications. If you meet specific medical criteria, a Critical access hospital [address] may call you directly to coordinate your care with your primary care physician and your other care providers. For questions about the [facility] programs, please, contact your physicians office. For general questions or additional information about Accountable Care Organizations: 
Please visit www.medicare.gov/acos. html or call 1-800-MEDICARE (1-450.454.4814) [employer] users should call 2-329.327.3448. Introducing [facility]! [facility] introduces Omise patient portal. Now you can access parts of your medical record, email your doctor's office, and request medication refills online. 1. In your internet browser, go to https://"[employer]. KDW/"[employer] 2. Click on the First Time User? Click Here link in the Sign In box. You will see the New Member Sign Up page. 3. Enter your Omise Access Code exactly as it appears below. You will not need to use this code after youve completed the sign-up process. If you do not sign up before the expiration date, you must request a new code. · Omise Access Code: [email or web address] Expires: 2/12/2018  1:13 PM 
 
4. Enter the last four digits of your Social Security Number (xxxx) and Date of Birth (mm/dd/yyyy) as indicated and click Submit. You will be taken to the next sign-up page. 5. Create a Omise ID. This will be your Omise login ID and cannot be changed, so think of one that is secure and easy to remember. 6. Create a Omise password. You can change your password at any time. 7. Enter your Password Reset Question and Answer. This can be used at a later time if you forget your password. 8. Enter your e-mail address. You will receive e-mail notification when new information is available in [address] 9. Click Sign Up. You can now view and download portions of your medical record. 10. Click the Download Summary menu link to download a portable copy of your medical information. If you have questions, please visit the Frequently Asked Questions section of the Omise website. Remember, Omise is NOT to be used for urgent needs. For medical emergencies, dial 911. Now available from your iPhone and Android! Unresulted Labs-Please follow up with your PCP about these lab tests Order Current Status NC XR TECHNOLOGIST SERVICE In process Providers Seen During Your Hospitalization Provider Specialty Primary office phone Burke Morales MD Neurosurgery [phone] Your Primary Care Physician (PCP) Primary Care Physician Office Phone Office Fax Keshia Verdugo [phone] You are allergic to the following No active allergies Recent Documentation Height Weight BMI OB Status Smoking Status 1.702 m 100.5 kg 34.71 kg/m2 Hysterectomy Former Smoker Emergency Contacts Name Discharge Info Relation Home Work Mobile Tim Flores DISCHARGE CAREGIVER [3] Spouse [3] [phone] Patient Belongings The following personal items are in your possession at time of discharge: 
  Dental Appliances: At home  Visual Aid: Glasses      Home Medications: None   Jewelry: None  Clothing: Shirt, Pants, Footwear, Undergarments    Other Valuables: Eyeglasses Please provide this summary of care documentation to your next provider. Signatures-by signing, you are acknowledging that this After Visit Summary has been reviewed with you and you have received a copy. Patient Signature:  ____________________________________________________________ Date:  ____________________________________________________________  
  
Eduardo Bahenamp Provider Signature:  ____________________________________________________________ Date:  ____________________________________________________________

## 2017-12-26 NOTE — INTERVAL H&P NOTE
H&P Update:  Humble Vazquez was seen and examined. History and physical has been reviewed. The patient has been examined.  There have been no significant clinical changes since the completion of the originally dated History and Physical.    Signed By: Ernesto Luo MD     December 26, 2017 11:13 AM

## 2017-12-26 NOTE — H&P
Subjective:                Jeannie Dey is a 75 y.o. female who presents with a long H/O low back problems. For more than 5 years, she has had sciatica in the LLE that she has managed fairly well with pain medication. At the end of May, she had a syncopal episode at work. More recently, several weeks ago, she had the acute onset of new sharp, stabbing pain in the R lumbar paraspinous region radiating into the R L5 distribution to the midcalf. She cannot identify a precipitating incident. She has paresthesias with this but no focal weakness. She does find that she limps when she has been walking any distance. The pain is very mechanical in nature. She cannot work due to this, as she has to stand and walk quite a bit. She has not done any conservative Rx for this.              Patient Active Problem List     Diagnosis Date Noted    Lumbar disc disease 08/18/2017    Colon polyps 07/26/2017    Syncope 07/14/2017    Palpitations 07/14/2017    Vitamin D deficiency 05/16/2017    Chronic back pain      Renal cyst, acquired, right      Essential hypertension      Obesity, Class I, BMI 30-34. 9      Essential hypertension with goal blood pressure less than 140/90 08/08/2016    GERD (gastroesophageal reflux disease) 08/14/2012    Sciatica 08/14/2012          Anastasiia Jasmine MD              Past Medical History:   Diagnosis Date    Anemia      Chronic back pain      Colon polyps 07/26/2017     tubular adenoma    Essential hypertension      GERD (gastroesophageal reflux disease) 8/14/2012    Large Hiatal hernia      Lumbar disc disease 8/18/2017    Obesity, Class I, BMI 30-34. 9      Renal cyst, acquired, right      Spondylosis of lumbar joint      Vitamin D deficiency 5/16/2017                   Past Surgical History:   Procedure Laterality Date    HX APPENDECTOMY        HX COLONOSCOPY   07/26/2017 7- - Colonoscopy - Colon polyps hepatic flexure, diverticulosis of colon - sent for scanning    HX ENDOSCOPY   07/26/2017 7- - EGD- hiatal hernia, gastric erythema - very focal and nonspecific erythema- plan avoid NSAIDS, continue PPI. - sent for scanning    HX JAYLYN AND BSO              No Known Allergies                 Family History   Problem Relation Age of Onset    Cancer Mother         breast    Diabetes Mother      Breast Cancer Mother      Cancer Sister         breast    Breast Cancer Sister      Heart Disease Father         Infection s/p CABG    Arthritis-osteo Brother         Back and Knees    Obesity Sister      Diabetes Sister      Arthritis-osteo Sister         Needs Knee Replacement    Crohn's Disease Sister      Psychiatric Disorder Brother                Current Outpatient Prescriptions   Medication Sig    meloxicam (MOBIC) 7.5 mg tablet Take 1 Tab by mouth two (2) times daily (after meals).  HYDROcodone-acetaminophen (NORCO)  mg tablet Take 1/2 to 1 tab three times daily as needed for pain    metoprolol succinate (TOPROL-XL) 50 mg XL tablet Take 1 Tab by mouth daily.  esomeprazole (NEXIUM) 40 mg capsule TAKE 1 CAPSULE DAILY    cholecalciferol (VITAMIN D3) 1,000 unit tablet Take  by mouth daily.      No current facility-administered medications for this visit.           Review of Systems: A comprehensive review of systems was negative except for that written in the HPI. Progress Notes  Encounter Date: 9/7/2017  Toma Davila, Certified Medical Assistant       []Hide copied text  []Prosperver for attribution information  Review of Systems      Constitutional:                    POSRecent weight changes, NOfever, fatigue, sleep difficulties, loss of appetite   ENT/Mouth:  NOHearing loss, ringing in the ears, chronic sinus problem, nose bleeds,sore throat, voice change, hoarseness, swollen glands in neck, or difficulties with chewing and swallowing.    Cardiovascular:  No chest pain/angina pectoris, palpitations, swelling of feet/ankles/hands, or calf pain while walking. Respiratory: No chronic or frequent coughs, spitting up blood, shortness of breath, asthma, or wheezing. Gastrointestinal: No a bdominal pain, heartburn, POSnausea, vomiting, POSconstipation, or frequent diarrhea   Genitourinary: No frequent urination, burning or painful urination, or blood in urine   Musculoskeletal:   POSjoint pain, NOstiffness/swelling, weakness of muscles, or muscle pain/cramp   Integument:   No rash/itching   Neurological:  No dizziness/vertigo, numbness/tingling sensation, tremors, weakness in limbs, frequent or recurring headaches, memory loss or confusion.           Endocrine: No excessive thirst or urination, heat or cold intolerance.                           Electronically signed by Kyrie Quiorz Medical Assistant at 09/07/17 0586   ·    Abstract on 9/7/2017   ·     ·    Detailed Report   ·     Note Details   Author Kyrie Quiroz Medical Assistant File Time 09/07/17 6155   Author Type Medical Assistant Status Signed   Last  Kyrie Quiroz Medical Assistant Service (none)               Objective:      /80  Temp 97.5 °F (36.4 °C)  Ht 5' 7\" (1.702 m)  Wt 201 lb (91.2 kg)  BMI 31.48 kg/m2     Physical Exam:         General:  Alert, cooperative, no distress, appears stated age. Eyes:  Conjunctivae/corneas clear. PERRL, EOMs intact. Fundi benign   Ears:  Normal TMs and external ear canals both ears. Nose: Nares normal. Septum midline. Mucosa normal. No drainage or sinus tenderness. Mouth/Throat: Lips, mucosa, and tongue normal. Teeth and gums normal.   Neck: Supple, symmetrical, trachea midline, no adenopathy, thyroid: no enlargment/tenderness/nodules, no carotid bruit and no JVD. Back:   Symmetric, no curvature. ROM normal. No CVA tenderness. Lungs:   Clear to auscultation bilaterally. Heart:  Regular rate and rhythm, S1, S2 normal, no murmur, click, rub or gallop. Abdomen:   Soft, non-tender. Bowel sounds normal. No masses,  No organomegaly. Extremities: Extremities normal, atraumatic, no cyanosis or edema. Pulses: 2+ and symmetric all extremities. Skin: Skin color, texture, turgor normal. No rashes or lesions   Lymph nodes: Cervical, supraclavicular, and axillary nodes normal.   Appearance: The patient is well developed, well nourished, provides a coherent history and is in no acute distress.         GCS15, A&Ox3. Speech is fluent and appropriate. Cranial Nerves:   Intact visual fields. Fundi are benign. REYES, EOM's full, no nystagmus, no ptosis. Facial sensation is normal. Corneal reflexes are intact. Facial movement is symmetric. Hearing is normal bilaterally. Palate is midline with normal sternocleidomastoid and trapezius muscles are normal. Tongue is midline. Motor:  5/5 strength in upper and lower proximal and distal muscles. Normal bulk and tone. No fasciculations. Reflexes:   Deep tendon reflexes 2+/4 and symmetrical.  Barnes's sign is negative bilaterally. There is no clonus present. Sensory:   Normal to touch, pinprick and vibration. Gait:  Mildly antalgic, favors R    Tremor:   No tremor noted. Cerebellar:  No cerebellar signs present. Romberg's sign is negative. Tandem gait is normal.  +SLR R       Assessment:      The MRI L-spine reveals a broad based HNP at L4-5 which produces severe stenosis, R>L.     The upright dynamic L-spine films do not reveal any abnormal motion.     Plan:      I do suspect the L4-5 findings are the etiology of her R sided symptoms. She wishes to proceed with surgery, which will involve a B L4-5 discectomy. She understands the nature of the procedure and the risks and benefits. We will set this up at the earliest convenience.

## 2017-12-26 NOTE — OP NOTES
Sandy Leon [id number]  [id number]    1950  75 y.o.  female       Operative Report    Date of Surgery: 12/26/2017     Preoperative Diagnosis: Herniated nucleus pulposus, L4-5 [M51.26]  Lumbar stenosis with neurogenic claudication [M48.062]      Postoperative Diagnosis: Herniated nucleus pulposus, L4-5 [M51.26]  Lumbar stenosis with neurogenic claudication [M48.062]     Surgeon(s) and Role:     * Manuel Thurman MD - Primary    Anesthesia: General     Procedure:   1. Bilateral L4-L5 partial hemilaminectomy, discectomy, and foraminotomy for decompression of nerves. 2. Microsurgery. 3. Intraoperative fluoroscopic guidance. Procedure in Detail:   After appropriate informed consent was obtained, the patient was taken to the operating suite where satisfactory general endotracheal anesthesia was induced. The patient was then turned into the prone position on the operating table on chest rolls. All pressure points were carefully padded. Perioperative antibiotics were given. The lumbar region was prepped and draped in the usual sterile fashion. Intraoperative fluoroscopy was used to localize the L4-L5 level. The skin was infiltrated with 1% lidocaine with epinephrine. A 16 mm vertical linear incision was then made directly over the L4-L5 level 1.5 cm to the right of midline. Dissection was carried down through the subcutaneous tissue. The fascia was perforated using a K wire. The sequential  dilators were passed over the K wire and were docked on the right L4-L5 laminae under fluoroscopic guidance. A tubular retractor was passed over the dilators and was also docked on the right L4-L5 laminae under fluoroscopic guidance. The retractor was secured firmly in place using the articulating arm. The dilators were removed from the wound. The operating microscope was brought into the field. Next, a small amount of soft tissue was removed from the right L4-L5 laminae.  The Midas-Nick high speed drill and Kerrison rongeur were used to perform a right L4-L5 partial hemilaminectomy. The ligament was removed with care being given to protection of the underlying neural elements. The right L5 nerve root was identified and was gently mobilized using microsurgical technique. The nerve root was then gently retracted medially, allowing for identification of a moderate subcapsular disc herniation directly beneath the shoulder of the nerve root. This disc was incised sharply and was removed in a piecemeal fashion. A small amount of additional loose disc material was removed from the disc space itself. A generous foraminotomy was carried out over the right L5 nerve root. The nerves were then inspected and were found to be completely free. Further microsurgical exploration in the epidural space did not reveal any further evidence of free disc material. The wound was then irrigated copiously with antibiotic solution. Meticulous hemostasis was obtained. The operating microscope was removed from the field. The tubular retractor was removed from the wound. The fascia was closed using a single 0 Vicryl suture. The subcutaneous tissue was closed in a layered fashion. The exact same procedure was then carried out at L4-L5 on the left side with the same findings and results. The skin edges were approximated using Dermabond. A sterile dressing was applied overall. The patient was then turned back into the supine position on the stretcher where satisfactory general anesthesia was reversed. The patient was then taken to the recovery room in satisfactory condition. Estimated Blood Loss:   25cc           Specimens:  None    Drains: None                Complications: None    Counts: Sponge and needle counts were correct times two.     Signed By:  Edinson Rivero MD     December 26, 2017

## 2017-12-26 NOTE — DISCHARGE INSTRUCTIONS
[facility]  [address]  [phone]    Postoperative Home Instructions  Lumbar (Back) Surgery    · Showering: You may shower the first day you are home with the dressing on. If the dressing becomes saturated, change it completely to a similar dressing. Leave the steri-strips or glue in place. After 3 days, you may take the dressing off and leave it off. If you have the brown aquacel dressing, leave it alone for 5 days and then you may remove the dressing. Do not soak in a tub, and use only soap and water on the wound. · Wound Care: A small to moderate amount of reddish drainage on the dressing is normal the first 1-2 days after surgery. If the dressing becomes saturated, change it. Once the steri-strips begin to peel up on their own, you may gently take them off. Large amounts of clear, watery drainage is not normal and you should call our office immediately. · Signs of Infection: Extreme tenderness at the wound, excessive redness and/or swelling, or ugly yellowish-greenish drainage from the wound. Fever greater than 100.5 may be present. If you think you have a wound infection, call our office immediately. · Driving: You may not begin driving until after your visit to our office for a wound and suture check which is normally 7-10 days after you come home from the hospital.    · Medications: You should take anti-inflammatory medications such as Motrin, Celebrex for 30 days after surgery, every day, on a regular schedule only if prescribed by your physician. The pain medicine prescribed may be taken as needed. You should take a stool softener (Colace) twice a day, drink lots of water and eat high fiber foods to avoid constipation (this is a common problem with pain medicine.)    · Deep Breathing Exercises: Continue to do your incentive spirometry and/or deep breathing exercises to prevent risk of pneumonia.     · Smoking: YOU MAY NOT SMOKE! Smoking will interfere with your healing. If you smoke, you may end up with having another surgery or more problems! · Activity: No heavy lifting for 4 weeks after surgery. This means anything heavier than a coffee cup or newspaper. After 4 weeks, you may gradually begin lifting heavier things. Avoid bending, stooping, or twisting at the waist.  Do not lie on your stomach to sleep. · You may remove your [place] hose when consistently walking. You may do steps and inclines in moderation. · Sexual Relations: You may resume sexual relations 2 weeks after your surgery. · Walking Program: You should begin walking every day on the first day after surgery. Start for short distances, then go a little farther each day. You should eventually walk 1-2 miles every day for the long term. This is very important in your recovery period because walking strengthens the spinal muscles and will help protect your disc and vertebrae. · Symptoms after Surgery: Dont be alarmed if you still have some symptoms after surgery. The nerves often require time to heal after the pressure has been taken off. Be patient, you should see improvement with time. · Follow-up: You will need to call our office for an appointment to see a nurse one week after surgery for a wound check. An appointment will be made then for you to see your surgeon about 4 weeks after surgery. Please call our office if you have any other questions or problems. Listen to your body; it will tell you if you are overdoing it. Use common sense and take care of yourself!      After general anesthesia or intravenous sedation, for 24 hours or while taking prescription Narcotics:  · Limit your activities  · Do not drive and operate hazardous machinery  · Do not make important personal or business decisions  · Do  not drink alcoholic beverages  · If you have not urinated within 8 hours after discharge, please contact your surgeon on call.    *  Please give a list of your current medications to your Primary Care Provider. *  Please update this list whenever your medications are discontinued, doses are      changed, or new medications (including over-the-counter products) are added. *  Please carry medication information at all times in case of emergency situations. These are general instructions for a healthy lifestyle:  No smoking/ No tobacco products/ Avoid exposure to second hand smoke  Surgeon General's Warning:  Quitting smoking now greatly reduces serious risk to your health. Obesity, smoking, and sedentary lifestyle greatly increases your risk for illness  A healthy diet, regular physical exercise & weight monitoring are important for maintaining a healthy lifestyle    You may be retaining fluid if you have a history of heart failure or if you experience any of the following symptoms:  Weight gain of 3 pounds or more overnight or 5 pounds in a week, increased swelling in our hands or feet or shortness of breath while lying flat in bed. Please call your doctor as soon as you notice any of these symptoms; do not wait until your next office visit. Recognize signs and symptoms of STROKE:  F-face looks uneven  A-arms unable to move or move unevenly  S-speech slurred or non-existent  T-time-call 911 as soon as signs and symptoms begin-DO NOT go       Back to bed or wait to see if you get better-TIME IS BRAIN.

## 2017-12-26 NOTE — ANESTHESIA PREPROCEDURE EVALUATION
Anesthetic History   No history of anesthetic complications            Review of Systems / Medical History  Patient summary reviewed and pertinent labs reviewed    Pulmonary  Within defined limits                 Neuro/Psych         Psychiatric history     Cardiovascular    Hypertension: well controlled              Exercise tolerance[de-identified] ~ 4 METS - likely due to back pain per patient  Comments: JOSEFINAM - last ECHO 8/17 showed resting LVOT gradient <10 with valsalva > 80.   Preserved EF   GI/Hepatic/Renal     GERD: well controlled           Endo/Other        Obesity and arthritis     Other Findings            Physical Exam    Airway  Mallampati: II  TM Distance: 4 - 6 cm  Neck ROM: normal range of motion   Mouth opening: Normal     Cardiovascular    Rhythm: regular  Rate: normal         Dental         Pulmonary  Breath sounds clear to auscultation               Abdominal         Other Findings            Anesthetic Plan    ASA: 3  Anesthesia type: general          Induction: Intravenous  Anesthetic plan and risks discussed with: Patient

## 2017-12-27 NOTE — ANESTHESIA POSTPROCEDURE EVALUATION
Post-Anesthesia Evaluation and Assessment    Patient: Les Ordoñez MRN: [id number]  SSN: [id number]    YOB: 1950  Age: 75 y.o. Sex: female       Cardiovascular Function/Vital Signs  Visit Vitals    /68 (BP 1 Location: Left arm, BP Patient Position: At rest)    Pulse (!) 56    Temp 36.7 °C (98 °F)    Resp 14    Ht 5' 7\" (1.702 m)    Wt 100.5 kg (221 lb 9.6 oz)    SpO2 90%    BMI 34.71 kg/m2       Patient is status post general anesthesia for Procedure(s):  BILATERAL L4-5 DISCECTOMY. Nausea/Vomiting: None    Postoperative hydration reviewed and adequate. Pain:  Pain Scale 1: Visual (12/26/17 1539)  Pain Intensity 1: 0 (12/26/17 1539)   Managed    Neurological Status:   Neuro (WDL): Within Defined Limits (12/26/17 1539)  Neuro  LUE Motor Response: Purposeful (12/26/17 1539)  LLE Motor Response: Purposeful (12/26/17 1539)  RUE Motor Response: Purposeful (12/26/17 1539)  RLE Motor Response: Purposeful (12/26/17 1539)   At baseline    Mental Status and Level of Consciousness: Arousable    Pulmonary Status:   O2 Device: Nasal cannula (12/26/17 1539)   Adequate oxygenation and airway patent    Complications related to anesthesia: None    Post-anesthesia assessment completed.  No concerns    Signed By: Alexander Baptiste MD     December 26, 2017

## 2018-01-16 NOTE — THERAPY DISCHARGE
Jessi Shetty has been seen in physical therapy from 9/14/2017 to 10/19/2017 for 5 visits. Treatment has been discontinued at this time due to patient failing to return for additional treatment. The below goals were met prior to discontinuation. Some goals were not met due to patient not returning. Thank you for this referral.   Everette Blank, PT, DPT, OCS.

## 2018-08-04 ENCOUNTER — HOSPITAL ENCOUNTER (EMERGENCY)
Age: 68
Discharge: HOME | End: 2018-08-04
Attending: EMERGENCY MEDICINE
Payer: MEDICARE

## 2018-08-04 VITALS
OXYGEN SATURATION: 98 % | HEIGHT: 67 IN | BODY MASS INDEX: 34.06 KG/M2 | WEIGHT: 217 LBS | DIASTOLIC BLOOD PRESSURE: 82 MMHG | HEART RATE: 64 BPM | SYSTOLIC BLOOD PRESSURE: 171 MMHG | TEMPERATURE: 98.4 F | RESPIRATION RATE: 16 BRPM

## 2018-08-04 DIAGNOSIS — M54.41 ACUTE RIGHT-SIDED LOW BACK PAIN WITH RIGHT-SIDED SCIATICA: Primary | ICD-10-CM

## 2018-08-04 PROCEDURE — 99283 EMERGENCY DEPT VISIT LOW MDM: CPT | Performed by: EMERGENCY MEDICINE

## 2018-08-04 PROCEDURE — 96372 THER/PROPH/DIAG INJ SC/IM: CPT | Performed by: EMERGENCY MEDICINE

## 2018-08-04 PROCEDURE — 74011250636 HC RX REV CODE- 250/636: Performed by: EMERGENCY MEDICINE

## 2018-08-04 PROCEDURE — 74011250637 HC RX REV CODE- 250/637: Performed by: EMERGENCY MEDICINE

## 2018-08-04 RX ORDER — METHYLPREDNISOLONE 4 MG/1
TABLET ORAL
Qty: 1 DOSE PACK | Refills: 0 | Status: SHIPPED | OUTPATIENT
Start: 2018-08-04 | End: 2018-09-27

## 2018-08-04 RX ORDER — HYDROCODONE BITARTRATE AND ACETAMINOPHEN 7.5; 325 MG/1; MG/1
1 TABLET ORAL
Qty: 20 TAB | Refills: 0 | Status: SHIPPED | OUTPATIENT
Start: 2018-08-04 | End: 2018-09-27

## 2018-08-04 RX ORDER — DEXAMETHASONE SODIUM PHOSPHATE 100 MG/10ML
10 INJECTION INTRAMUSCULAR; INTRAVENOUS
Status: COMPLETED | OUTPATIENT
Start: 2018-08-04 | End: 2018-08-04

## 2018-08-04 RX ORDER — MORPHINE SULFATE 10 MG/ML
10 INJECTION, SOLUTION INTRAMUSCULAR; INTRAVENOUS
Status: COMPLETED | OUTPATIENT
Start: 2018-08-04 | End: 2018-08-04

## 2018-08-04 RX ORDER — METHOCARBAMOL 750 MG/1
750 TABLET, FILM COATED ORAL
Qty: 20 TAB | Refills: 0 | Status: SHIPPED | OUTPATIENT
Start: 2018-08-04 | End: 2018-10-29

## 2018-08-04 RX ORDER — ONDANSETRON 8 MG/1
8 TABLET, ORALLY DISINTEGRATING ORAL
Status: COMPLETED | OUTPATIENT
Start: 2018-08-04 | End: 2018-08-04

## 2018-08-04 RX ADMIN — ONDANSETRON 8 MG: 8 TABLET, ORALLY DISINTEGRATING ORAL at 12:43

## 2018-08-04 RX ADMIN — MORPHINE SULFATE 10 MG: 10 INJECTION INTRAVENOUS at 12:43

## 2018-08-04 RX ADMIN — DEXAMETHASONE SODIUM PHOSPHATE 10 MG: 10 INJECTION INTRAMUSCULAR; INTRAVENOUS at 12:43

## 2018-08-04 NOTE — DISCHARGE INSTRUCTIONS
Learning About Relief for Back Pain  What is back tension and strain? Back strain happens when you overstretch, or pull, a muscle in your back. You may hurt your back in an accident or when you exercise or lift something. Most back pain will get better with rest and time. You can take care of yourself at home to help your back heal.  What can you do first to relieve back pain? When you first feel back pain, try these steps:  · Walk. Take a short walk (10 to 20 minutes) on a level surface (no slopes, hills, or stairs) every 2 to 3 hours. Walk only distances you can manage without pain, especially leg pain. · Relax. Find a comfortable position for rest. Some people are comfortable on the floor or a medium-firm bed with a small pillow under their head and another under their knees. Some people prefer to lie on their side with a pillow between their knees. Don't stay in one position for too long. · Try heat or ice. Try using a heating pad on a low or medium setting, or take a warm shower, for 15 to 20 minutes every 2 to 3 hours. Or you can buy single-use heat wraps that last up to 8 hours. You can also try an ice pack for 10 to 15 minutes every 2 to 3 hours. You can use an ice pack or a bag of frozen vegetables wrapped in a thin towel. There is not strong evidence that either heat or ice will help, but you can try them to see if they help. You may also want to try switching between heat and cold. · Take pain medicine exactly as directed. ¨ If the doctor gave you a prescription medicine for pain, take it as prescribed. ¨ If you are not taking a prescription pain medicine, ask your doctor if you can take an over-the-counter medicine. What else can you do? · Stretch and exercise. Exercises that increase flexibility may relieve your pain and make it easier for your muscles to keep your spine in a good, neutral position. And don't forget to keep walking. · Do self-massage.  You can use self-massage to unwind after work or school or to energize yourself in the morning. You can easily massage your feet, hands, or neck. Self-massage works best if you are in comfortable clothes and are sitting or lying in a comfortable position. Use oil or lotion to massage bare skin. · Reduce stress. Back pain can lead to a vicious [place]: Distress about the pain tenses the muscles in your back, which in turn causes more pain. Learn how to relax your mind and your muscles to lower your stress. Where can you learn more? Go to http://yvonne-catalina.info/. Enter N269 in the search box to learn more about \"Learning About Relief for Back Pain. \"  Current as of: November 29, 2017  Content Version: 11.7  © 8544-7847 [employer]. Care instructions adapted under license by [employer] (which disclaims liability or warranty for this information). If you have questions about a medical condition or this instruction, always ask your healthcare professional. [place] [address] any warranty or liability for your use of this information. Back Pain, Emergency or Urgent Symptoms: Care Instructions  Your Care Instructions    Many people have back pain at one time or another. In most cases, pain gets better with self-care that includes over-the-counter pain medicine, ice, heat, and exercises. Unless you have symptoms of a severe injury or heart attack, you may be able to give yourself a few days before you call a doctor. But some back problems are very serious. Do not ignore symptoms that need to be checked right away. Follow-up care is a key part of your treatment and safety. Be sure to make and go to all appointments, and call your doctor if you are having problems. It's also a good idea to know your test results and keep a list of the medicines you take. How can you care for yourself at home? · Sit or lie in positions that are most comfortable and that reduce your pain.  Try one of these positions when you lie down:  ¨ Lie on your back with your knees bent and supported by large pillows. ¨ Lie on the floor with your legs on the seat of a sofa or chair. Maddy Suarez on your side with your knees and hips bent and a pillow between your legs. ¨ Lie on your stomach if it does not make pain worse. · Do not sit up in bed, and avoid soft couches and twisted positions. Bed rest can help relieve pain at first, but it delays healing. Avoid bed rest after the first day. · Change positions every 30 minutes. If you must sit for long periods of time, take breaks from sitting. Get up and walk around, or lie flat. · Try using a heating pad on a low or medium setting, for 15 to 20 minutes every 2 or 3 hours. Try a warm shower in place of one session with the heating pad. You can also buy single-use heat wraps that last up to 8 hours. You can also try ice or cold packs on your back for 10 to 20 minutes at a time, several times a day. (Put a thin cloth between the ice pack and your skin.) This reduces pain and makes it easier to be active and exercise. · Take pain medicines exactly as directed. ¨ If the doctor gave you a prescription medicine for pain, take it as prescribed. ¨ If you are not taking a prescription pain medicine, ask your doctor if you can take an over-the-counter medicine. When should you call for help? Call 911 anytime you think you may need emergency care. For example, call if:    · You are unable to move a leg at all.     · You have back pain with severe belly pain.     · You have symptoms of a heart attack. These may include:  ¨ Chest pain or pressure, or a strange feeling in the chest.  ¨ Sweating. ¨ Shortness of breath. ¨ Nausea or vomiting. ¨ Pain, pressure, or a strange feeling in the back, neck, jaw, or upper belly or in one or both shoulders or arms. ¨ Lightheadedness or sudden weakness. ¨ A fast or irregular heartbeat.   After you call 911, the  may tell you to chew 1 adult-strength or 2 to 4 low-dose aspirin. Wait for an ambulance. Do not try to drive yourself.    Call your doctor now or seek immediate medical care if:    · You have new or worse symptoms in your arms, legs, chest, belly, or buttocks. Symptoms may include:  ¨ Numbness or tingling. ¨ Weakness. ¨ Pain.     · You lose bladder or bowel control.     · You have back pain and:  ¨ You have injured your back while lifting or doing some other activity. Call if the pain is severe, has not gone away after 1 or 2 days, and you cannot do your normal daily activities. ¨ You have had a back injury before that needed treatment. ¨ Your pain has lasted longer than 4 weeks. ¨ You have had weight loss you cannot explain. ¨ You have a fever. ¨ You are age 48 or older. ¨ You have cancer now or have had it before.    Watch closely for changes in your health, and be sure to contact your doctor if you are not getting better as expected. Where can you learn more? Go to http://yvonne-catalina.info/. Enter M977 in the search box to learn more about \"Back Pain, Emergency or Urgent Symptoms: Care Instructions. \"  Current as of: November 20, 2017  Content Version: 11.7  © 8243-0247 [employer]. Care instructions adapted under license by [employer] (which disclaims liability or warranty for this information). If you have questions about a medical condition or this instruction, always ask your healthcare professional. [place] [address] any warranty or liability for your use of this information. Low Back Pain: Exercises  Your Care Instructions  Here are some examples of typical rehabilitation exercises for your condition. Start each exercise slowly. Ease off the exercise if you start to have pain. Your doctor or physical therapist will tell you when you can start these exercises and which ones will work best for you. How to do the exercises  Press-up    1.  Lie on your stomach, supporting your body with your forearms. 2. Press your elbows down into the floor to raise your upper back. As you do this, relax your stomach muscles and allow your back to arch without using your back muscles. As your press up, do not let your hips or pelvis come off the floor. 3. Hold for 15 to 30 seconds, then relax. 4. Repeat 2 to 4 times. Alternate arm and leg (bird dog) exercise    Do this exercise slowly. Try to keep your body straight at all times, and do not let one hip drop lower than the other. 1. Start on the floor, on your hands and knees. 2. Tighten your belly muscles. 3. Raise one leg off the floor, and hold it straight out behind you. Be careful not to let your hip drop down, because that will twist your trunk. 4. Hold for about 6 seconds, then lower your leg and switch to the other leg. 5. Repeat 8 to 12 times on each leg. 6. Over time, work up to holding for 10 to 30 seconds each time. 7. If you feel stable and secure with your leg raised, try raising the opposite arm straight out in front of you at the same time. Knee-to-chest exercise    1. Lie on your back with your knees bent and your feet flat on the floor. 2. Bring one knee to your chest, keeping the other foot flat on the floor (or keeping the other leg straight, whichever feels better on your lower back). 3. Keep your lower back pressed to the floor. Hold for at least 15 to 30 seconds. 4. Relax, and lower the knee to the starting position. 5. Repeat with the other leg. Repeat 2 to 4 times with each leg. 6. To get more stretch, put your other leg flat on the floor while pulling your knee to your chest.  Curl-ups    1. Lie on the floor on your back with your knees bent at a 90-degree angle. Your feet should be flat on the floor, about 12 inches from your buttocks.   2. Cross your arms over your chest. If this bothers your neck, try putting your hands behind your neck (not your head), with your elbows spread apart.  3. Slowly tighten your belly muscles and raise your shoulder blades off the floor. 4. Keep your head in line with your body, and do not press your chin to your chest.  5. Hold this position for 1 or 2 seconds, then slowly lower yourself back down to the floor. 6. Repeat 8 to 12 times. Pelvic tilt exercise    1. Lie on your back with your knees bent. 2. \"Brace\" your stomach. This means to tighten your muscles by pulling in and imagining your belly button moving toward your spine. You should feel like your back is pressing to the floor and your hips and pelvis are rocking back. 3. Hold for about 6 seconds while you breathe smoothly. 4. Repeat 8 to 12 times. Heel dig bridging    1. Lie on your back with both knees bent and your ankles bent so that only your heels are digging into the floor. Your knees should be bent about 90 degrees. 2. Then push your heels into the floor, squeeze your buttocks, and lift your hips off the floor until your shoulders, hips, and knees are all in a straight line. 3. Hold for about 6 seconds as you continue to breathe normally, and then slowly lower your hips back down to the floor and rest for up to 10 seconds. 4. Do 8 to 12 repetitions. Hamstring stretch in doorway    1. Lie on your back in a doorway, with one leg through the open door. 2. Slide your leg up the wall to straighten your knee. You should feel a gentle stretch down the back of your leg. 3. Hold the stretch for at least 15 to 30 seconds. Do not arch your back, point your toes, or bend either knee. Keep one heel touching the floor and the other heel touching the wall. 4. Repeat with your other leg. 5. Do 2 to 4 times for each leg. Hip flexor stretch    1. Kneel on the floor with one knee bent and one leg behind you. Place your forward knee over your foot. Keep your other knee touching the floor. 2. Slowly push your hips forward until you feel a stretch in the upper thigh of your rear leg.   3. Hold the stretch for at least 15 to 30 seconds. Repeat with your other leg. 4. Do 2 to 4 times on each side. Wall sit    1. Stand with your back 10 to 12 inches away from a wall. 2. Lean into the wall until your back is flat against it. 3. Slowly slide down until your knees are slightly bent, pressing your lower back into the wall. 4. Hold for about 6 seconds, then slide back up the wall. 5. Repeat 8 to 12 times. Follow-up care is a key part of your treatment and safety. Be sure to make and go to all appointments, and call your doctor if you are having problems. It's also a good idea to know your test results and keep a list of the medicines you take. Where can you learn more? Go to http://yvonne-catalina.info/. Enter A293 in the search box to learn more about \"Low Back Pain: Exercises. \"  Current as of: November 29, 2017  Content Version: 11.7  © 5450-7009 [employer]. Care instructions adapted under license by [employer] (which disclaims liability or warranty for this information). If you have questions about a medical condition or this instruction, always ask your healthcare professional. [address] any warranty or liability for your use of this information.

## 2018-08-04 NOTE — ED NOTES
I have reviewed discharge instructions with the patient and spouse. The patient and spouse verbalized understanding. Patient left ED via Discharge Method: ambulatory to Home with Kathleen Schlatter her . Opportunity for questions and clarification provided. Patient given 3 scripts. To continue your aftercare when you leave the hospital, you may receive an automated call from our care team to check in on how you are doing. This is a free service and part of our promise to provide the best care and service to meet your aftercare needs.  If you have questions, or wish to unsubscribe from this service please call [phone]. Thank you for Choosing our [facility] Emergency Department.

## 2018-08-04 NOTE — ED PROVIDER NOTES
HPI Comments: Patient underwent laminectomy in December 2017, had been off pain medication for approximately last 3 months with mild little flares of back pain until approximately a week ago when it got progressively worse. Pain is very similar to pain she had prior to the surgery, radiating down the right buttocks and right lateral thigh. Denies loss of bowel or bladder function, paralysis or other paresthesias. Patient is a 75 y.o. female presenting with back pain. The history is provided by the patient and the spouse. Back Pain This is a new problem. The current episode started more than 2 days ago. The problem has been gradually worsening. The problem occurs constantly. Patient reports not work related injury. The pain is associated with no known injury. The pain is present in the lower back and right side. The quality of the pain is described as sharp, aching and similar to previous episodes. The pain radiates to the right thigh. The pain is at a severity of 8/10. The symptoms are aggravated by bending, twisting and certain positions. The pain is the same all the time. Stiffness is present all day. Associated symptoms include numbness (mild tingling righrt thigh) and tingling. Pertinent negatives include no chest pain, no fever, no weight loss, no headaches, no abdominal pain, no abdominal swelling, no bowel incontinence, no perianal numbness, no bladder incontinence, no dysuria, no pelvic pain, no leg pain, no paresthesias, no paresis and no weakness. She has tried bed rest for the symptoms. The treatment provided no relief. Risk factors include menopause. The patient's surgical history includes laminectomy. Past Medical History:  
Diagnosis Date  Anemia  Chronic back pain  Colon polyps 07/26/2017  
 tubular adenoma  Depression 9/18/2017  Essential hypertension   
 managed with meds  GERD (gastroesophageal reflux disease) 8/14/2012  
 daily med as needed  Large Hiatal hernia  Lumbar disc disease 8/18/2017  Obesity, Class I, BMI 30-34.9  Renal cyst, acquired, right  Spondylosis of lumbar joint  Vitamin D deficiency 5/16/2017 Past Surgical History:  
Procedure Laterality Date  HX APPENDECTOMY  HX COLONOSCOPY  07/26/2017 7- - Colonoscopy - Colon polyps hepatic flexure, diverticulosis of colon - sent for scanning  HX ENDOSCOPY  07/26/2017 7- - EGD- hiatal hernia, gastric erythema - very focal and nonspecific erythema- plan avoid NSAIDS, continue PPI. - sent for scanning  HX LUMBAR LAMINECTOMY  12/2017 L4-5 - Dr. Bean Harness  HX JAYLYN AND BSO Family History:  
Problem Relation Age of Onset  Cancer Mother   
  breast  
 Diabetes Mother  Breast Cancer Mother  Cancer Sister   
  breast  
 Breast Cancer Sister  Heart Disease Father Infection s/p CABG  Arthritis-osteo Brother Back and Knees  Obesity Sister  Diabetes Sister  Arthritis-osteo Sister Needs Knee Replacement  Crohn's Disease Sister  Psychiatric Disorder Brother Social History Social History  Marital status:  Spouse name: N/A  
 Number of children: N/A  
 Years of education: N/A Occupational History [employer] Social History Main Topics  Smoking status: Former Smoker Packs/day: 0.20 Years: 30.00 Types: Cigarettes Quit date: 1/1/1981  Smokeless tobacco: Never Used  Alcohol use No  
 Drug use: No  
 Sexual activity: Yes  
  Partners: Male Other Topics Concern  Not on file Social History Narrative Lives with  4 children and 2 steps - adults ALLERGIES: Review of patient's allergies indicates no known allergies. Review of Systems Constitutional: Negative for fever and weight loss. Cardiovascular: Negative for chest pain. Gastrointestinal: Negative for abdominal pain and bowel incontinence. Genitourinary: Negative for bladder incontinence, dysuria and pelvic pain. Musculoskeletal: Positive for back pain. Negative for neck pain and neck stiffness. Neurological: Positive for tingling and numbness (mild tingling righrt thigh). Negative for weakness, headaches and paresthesias. All other systems reviewed and are negative. Vitals:  
 08/04/18 1141 BP: 188/80 Pulse: 74 Resp: 16 Temp: 98.3 °F (36.8 °C) SpO2: 98% Weight: 98.4 kg (217 lb) Height: 5' 7\" (1.702 m) Physical Exam  
Constitutional: She is oriented to person, place, and time. She appears well-developed and well-nourished. She appears distressed. HENT:  
Head: Normocephalic and atraumatic. Right Ear: Tympanic membrane and external ear normal.  
Left Ear: Tympanic membrane and external ear normal.  
Eyes: Conjunctivae and EOM are normal. Pupils are equal, round, and reactive to light. Neck: Normal range of motion. Neck supple. Musculoskeletal: She exhibits no edema. Lumbar back: She exhibits decreased range of motion and tenderness (right gluteus and right SI joint). She exhibits no swelling, no edema, no deformity, no spasm and normal pulse. Neurological: She is alert and oriented to person, place, and time. She has normal strength. She displays normal reflexes. A sensory deficit (subjective decreased sensation right lateral thigh above the knee) is present. No cranial nerve deficit. Coordination normal.  
Negative straight leg raise bilaterally Skin: Skin is warm and dry. No rash noted. She is not diaphoretic. No erythema. Psychiatric: She has a normal mood and affect. Her speech is normal.  
Nursing note and vitals reviewed. MDM Number of Diagnoses or Management Options Acute right-sided low back pain with right-sided sciatica: new and does not require workup Amount and/or Complexity of Data Reviewed Review and summarize past medical records: yes Risk of Complications, Morbidity, and/or Mortality Presenting problems: low Diagnostic procedures: minimal 
Management options: low Patient Progress Patient progress: improved ED Course Procedures

## 2018-08-04 NOTE — ED TRIAGE NOTES
Pt states severe back pain since Thursday. States she had back surgery about eight months ago. Denies injury to the back.

## 2018-10-18 ENCOUNTER — HOSPITAL ENCOUNTER (OUTPATIENT)
Dept: GENERAL RADIOLOGY | Age: 68
Discharge: HOME | End: 2018-10-18
Attending: INTERNAL MEDICINE
Payer: MEDICARE

## 2018-10-18 DIAGNOSIS — R13.10 DYSPHAGIA, UNSPECIFIED TYPE: ICD-10-CM

## 2018-10-18 DIAGNOSIS — K21.9 GASTROESOPHAGEAL REFLUX DISEASE WITHOUT ESOPHAGITIS: ICD-10-CM

## 2018-10-18 PROCEDURE — 74011000250 HC RX REV CODE- 250: Performed by: INTERNAL MEDICINE

## 2018-10-18 PROCEDURE — 74220 X-RAY XM ESOPHAGUS 1CNTRST: CPT

## 2018-10-18 PROCEDURE — 74011000255 HC RX REV CODE- 255: Performed by: INTERNAL MEDICINE

## 2018-10-18 RX ADMIN — BARIUM SULFATE 700 MG: 700 TABLET ORAL at 08:57

## 2018-10-18 RX ADMIN — BARIUM SULFATE 135 ML: 980 POWDER, FOR SUSPENSION ORAL at 08:57

## 2018-10-18 RX ADMIN — ANTACID/ANTIFLATULENT 4 G: 380; 550; 10; 10 GRANULE, EFFERVESCENT ORAL at 08:57

## 2018-10-18 RX ADMIN — BARIUM SULFATE 100 ML: 0.6 SUSPENSION ORAL at 08:57

## 2018-12-07 PROBLEM — K44.9 HIATAL HERNIA: Status: ACTIVE | Noted: 2018-12-07

## 2018-12-07 PROBLEM — K25.7: Status: ACTIVE | Noted: 2018-12-07

## 2019-01-08 ENCOUNTER — HOSPITAL ENCOUNTER (OUTPATIENT)
Dept: MAMMOGRAPHY | Age: 69
Discharge: HOME | End: 2019-01-08
Attending: INTERNAL MEDICINE
Payer: MEDICARE

## 2019-01-08 DIAGNOSIS — M89.9 BONE DISORDER: ICD-10-CM

## 2019-01-08 DIAGNOSIS — E55.9 VITAMIN D DEFICIENCY: ICD-10-CM

## 2019-01-08 DIAGNOSIS — Z12.31 VISIT FOR SCREENING MAMMOGRAM: ICD-10-CM

## 2019-01-08 DIAGNOSIS — Z13.820 SCREENING FOR OSTEOPOROSIS: ICD-10-CM

## 2019-01-08 PROCEDURE — 77080 DXA BONE DENSITY AXIAL: CPT

## 2019-01-08 PROCEDURE — 77063 BREAST TOMOSYNTHESIS BI: CPT

## 2019-01-10 NOTE — PROGRESS NOTES
Will discuss results further at the following upcoming appointment:   Future Appointments  2/5/2019   9:40 AM    Britton Garcia MD         [facility]

## 2019-02-18 ENCOUNTER — HOSPITAL ENCOUNTER (OUTPATIENT)
Dept: MAMMOGRAPHY | Age: 69
Discharge: HOME | End: 2019-02-18
Attending: INTERNAL MEDICINE
Payer: MEDICARE

## 2019-02-18 DIAGNOSIS — R92.8 ABNORMAL MRI, BREAST: ICD-10-CM

## 2019-02-18 PROCEDURE — 76642 ULTRASOUND BREAST LIMITED: CPT

## 2019-07-18 ENCOUNTER — HOSPITAL ENCOUNTER (OUTPATIENT)
Dept: MRI IMAGING | Age: 69
Discharge: HOME | End: 2019-07-18
Attending: INTERNAL MEDICINE
Payer: MEDICARE

## 2019-07-18 DIAGNOSIS — R92.8 ABNORMAL MRI, BREAST: ICD-10-CM

## 2019-07-18 PROCEDURE — C8937 CAD BREAST MRI: HCPCS

## 2019-07-18 PROCEDURE — A9575 INJ GADOTERATE MEGLUMI 0.1ML: HCPCS | Performed by: INTERNAL MEDICINE

## 2019-07-18 PROCEDURE — 74011250636 HC RX REV CODE- 250/636: Performed by: INTERNAL MEDICINE

## 2019-07-18 PROCEDURE — 74011000258 HC RX REV CODE- 258: Performed by: INTERNAL MEDICINE

## 2019-07-18 PROCEDURE — 77049 MRI BREAST C-+ W/CAD BI: CPT

## 2019-07-18 RX ORDER — GADOTERATE MEGLUMINE 376.9 MG/ML
20 INJECTION INTRAVENOUS
Status: COMPLETED | OUTPATIENT
Start: 2019-07-18 | End: 2019-07-18

## 2019-07-18 RX ORDER — SODIUM CHLORIDE 0.9 % (FLUSH) 0.9 %
10 SYRINGE (ML) INJECTION
Status: COMPLETED | OUTPATIENT
Start: 2019-07-18 | End: 2019-07-18

## 2019-07-18 RX ADMIN — GADOTERATE MEGLUMINE 20 ML: 376.9 INJECTION INTRAVENOUS at 12:18

## 2019-07-18 RX ADMIN — SODIUM CHLORIDE 100 ML: 900 INJECTION, SOLUTION INTRAVENOUS at 12:18

## 2019-07-18 RX ADMIN — Medication 10 ML: at 12:18

## 2019-10-24 PROBLEM — Z01.810 PREOP CARDIOVASCULAR EXAM: Status: ACTIVE | Noted: 2019-10-24

## 2019-11-12 PROBLEM — E66.01 SEVERE OBESITY (HCC): Status: ACTIVE | Noted: 2019-11-12

## 2019-11-12 PROBLEM — E61.1 IRON DEFICIENCY: Status: ACTIVE | Noted: 2019-11-12

## 2019-11-23 PROBLEM — Z01.810 PREOP CARDIOVASCULAR EXAM: Status: RESOLVED | Noted: 2019-10-24 | Resolved: 2019-11-23

## 2019-12-03 ENCOUNTER — HOSPITAL ENCOUNTER (OUTPATIENT)
Dept: INFUSION THERAPY | Age: 69
Discharge: HOME | End: 2019-12-03
Payer: MEDICARE

## 2019-12-03 VITALS
DIASTOLIC BLOOD PRESSURE: 90 MMHG | SYSTOLIC BLOOD PRESSURE: 160 MMHG | TEMPERATURE: 98.8 F | HEART RATE: 71 BPM | RESPIRATION RATE: 16 BRPM | OXYGEN SATURATION: 94 %

## 2019-12-03 DIAGNOSIS — E61.1 IRON DEFICIENCY: Primary | ICD-10-CM

## 2019-12-03 PROCEDURE — 96374 THER/PROPH/DIAG INJ IV PUSH: CPT

## 2019-12-03 PROCEDURE — 74011000258 HC RX REV CODE- 258: Performed by: INTERNAL MEDICINE

## 2019-12-03 PROCEDURE — 74011250636 HC RX REV CODE- 250/636: Performed by: INTERNAL MEDICINE

## 2019-12-03 RX ORDER — ACETAMINOPHEN 325 MG/1
650 TABLET ORAL AS NEEDED
Status: ACTIVE | OUTPATIENT
Start: 2019-12-03 | End: 2019-12-03

## 2019-12-03 RX ORDER — HYDROCORTISONE SODIUM SUCCINATE 100 MG/2ML
100 INJECTION, POWDER, FOR SOLUTION INTRAMUSCULAR; INTRAVENOUS AS NEEDED
Status: ACTIVE | OUTPATIENT
Start: 2019-12-03 | End: 2019-12-03

## 2019-12-03 RX ORDER — ONDANSETRON 2 MG/ML
8 INJECTION INTRAMUSCULAR; INTRAVENOUS AS NEEDED
Status: ACTIVE | OUTPATIENT
Start: 2019-12-03 | End: 2019-12-03

## 2019-12-03 RX ORDER — SODIUM CHLORIDE 0.9 % (FLUSH) 0.9 %
10 SYRINGE (ML) INJECTION AS NEEDED
Status: ACTIVE | OUTPATIENT
Start: 2019-12-03 | End: 2019-12-03

## 2019-12-03 RX ORDER — DIPHENHYDRAMINE HYDROCHLORIDE 50 MG/ML
50 INJECTION, SOLUTION INTRAMUSCULAR; INTRAVENOUS AS NEEDED
Status: ACTIVE | OUTPATIENT
Start: 2019-12-03 | End: 2019-12-03

## 2019-12-03 RX ORDER — SODIUM CHLORIDE 9 MG/ML
10 INJECTION INTRAMUSCULAR; INTRAVENOUS; SUBCUTANEOUS AS NEEDED
Status: ACTIVE | OUTPATIENT
Start: 2019-12-03 | End: 2019-12-03

## 2019-12-03 RX ORDER — HEPARIN 100 UNIT/ML
300-500 SYRINGE INTRAVENOUS AS NEEDED
Status: ACTIVE | OUTPATIENT
Start: 2019-12-03 | End: 2019-12-03

## 2019-12-03 RX ORDER — ALBUTEROL SULFATE 0.83 MG/ML
2.5 SOLUTION RESPIRATORY (INHALATION) AS NEEDED
Status: DISPENSED | OUTPATIENT
Start: 2019-12-03 | End: 2019-12-03

## 2019-12-03 RX ORDER — EPINEPHRINE 1 MG/ML
0.3 INJECTION, SOLUTION, CONCENTRATE INTRAVENOUS AS NEEDED
Status: DISPENSED | OUTPATIENT
Start: 2019-12-03 | End: 2019-12-03

## 2019-12-03 RX ADMIN — SODIUM CHLORIDE 500 ML: 900 INJECTION, SOLUTION INTRAVENOUS at 11:17

## 2019-12-03 RX ADMIN — FERUMOXYTOL 510 MG: 510 INJECTION INTRAVENOUS at 11:02

## 2019-12-03 NOTE — PROGRESS NOTES
Arrived to the [facility]. Assessment and Feraheme completed. Patient tolerated well. Any issues or concerns during appointment: No.  Discharged ambulatory.

## 2020-01-07 ENCOUNTER — HOSPITAL ENCOUNTER (OUTPATIENT)
Dept: LAB | Age: 70
Discharge: HOME | End: 2020-01-07
Payer: MEDICARE

## 2020-01-07 DIAGNOSIS — D75.839 THROMBOCYTOSIS: ICD-10-CM

## 2020-01-07 DIAGNOSIS — D64.9 ANEMIA, UNSPECIFIED TYPE: ICD-10-CM

## 2020-01-07 LAB
ALBUMIN SERPL-MCNC: 3.6 G/DL (ref 3.2–4.6)
ALBUMIN/GLOB SERPL: 0.9 {RATIO} (ref 1.2–3.5)
ALP SERPL-CCNC: 86 U/L (ref 50–136)
ALT SERPL-CCNC: 71 U/L (ref 12–65)
ANION GAP SERPL CALC-SCNC: 6 MMOL/L (ref 7–16)
AST SERPL-CCNC: 34 U/L (ref 15–37)
BASOPHILS # BLD: 0.1 K/UL (ref 0–0.2)
BASOPHILS NFR BLD: 1 % (ref 0–2)
BILIRUB SERPL-MCNC: 0.3 MG/DL (ref 0.2–1.1)
BUN SERPL-MCNC: 14 MG/DL (ref 8–23)
CALCIUM SERPL-MCNC: 9.5 MG/DL (ref 8.3–10.4)
CHLORIDE SERPL-SCNC: 111 MMOL/L (ref 98–107)
CO2 SERPL-SCNC: 24 MMOL/L (ref 21–32)
CREAT SERPL-MCNC: 1.06 MG/DL (ref 0.6–1)
DIFFERENTIAL METHOD BLD: ABNORMAL
EOSINOPHIL # BLD: 0.1 K/UL (ref 0–0.8)
EOSINOPHIL NFR BLD: 2 % (ref 0.5–7.8)
ERYTHROCYTE [DISTWIDTH] IN BLOOD BY AUTOMATED COUNT: 17.6 % (ref 11.9–14.6)
FERRITIN SERPL-MCNC: 136 NG/ML (ref 8–388)
GLOBULIN SER CALC-MCNC: 4.2 G/DL (ref 2.3–3.5)
GLUCOSE SERPL-MCNC: 111 MG/DL (ref 65–100)
HCT VFR BLD AUTO: 44.2 % (ref 35.8–46.3)
HGB BLD-MCNC: 13.8 G/DL (ref 11.7–15.4)
IMM GRANULOCYTES # BLD AUTO: 0 K/UL (ref 0–0.5)
IMM GRANULOCYTES NFR BLD AUTO: 0 % (ref 0–5)
IRON SATN MFR SERPL: 16 %
IRON SERPL-MCNC: 56 UG/DL (ref 35–150)
LYMPHOCYTES # BLD: 2.2 K/UL (ref 0.5–4.6)
LYMPHOCYTES NFR BLD: 24 % (ref 13–44)
MCH RBC QN AUTO: 27.7 PG (ref 26.1–32.9)
MCHC RBC AUTO-ENTMCNC: 31.2 G/DL (ref 31.4–35)
MCV RBC AUTO: 88.8 FL (ref 79.6–97.8)
MONOCYTES # BLD: 0.7 K/UL (ref 0.1–1.3)
MONOCYTES NFR BLD: 8 % (ref 4–12)
NEUTS SEG # BLD: 6.3 K/UL (ref 1.7–8.2)
NEUTS SEG NFR BLD: 66 % (ref 43–78)
NRBC # BLD: 0 K/UL (ref 0–0.2)
PLATELET # BLD AUTO: 497 K/UL (ref 150–450)
PMV BLD AUTO: 10.2 FL (ref 9.4–12.3)
POTASSIUM SERPL-SCNC: 3.9 MMOL/L (ref 3.5–5.1)
PROT SERPL-MCNC: 7.8 G/DL (ref 6.3–8.2)
RBC # BLD AUTO: 4.98 M/UL (ref 4.05–5.25)
SODIUM SERPL-SCNC: 141 MMOL/L (ref 136–145)
TIBC SERPL-MCNC: 354 UG/DL (ref 250–450)
WBC # BLD AUTO: 9.5 K/UL (ref 4.3–11.1)

## 2020-01-07 PROCEDURE — 82728 ASSAY OF FERRITIN: CPT

## 2020-01-07 PROCEDURE — 80053 COMPREHEN METABOLIC PANEL: CPT

## 2020-01-07 PROCEDURE — 36415 COLL VENOUS BLD VENIPUNCTURE: CPT

## 2020-01-07 PROCEDURE — 83540 ASSAY OF IRON: CPT

## 2020-01-07 PROCEDURE — 85025 COMPLETE CBC W/AUTO DIFF WBC: CPT

## 2020-03-17 PROBLEM — I87.2 VENOUS INSUFFICIENCY: Status: ACTIVE | Noted: 2020-03-17

## 2020-08-05 ENCOUNTER — HOSPITAL ENCOUNTER (OUTPATIENT)
Dept: LAB | Age: 70
Discharge: HOME | End: 2020-08-05
Payer: MEDICARE

## 2020-08-05 DIAGNOSIS — E61.1 IRON DEFICIENCY: ICD-10-CM

## 2020-08-05 LAB
ALBUMIN SERPL-MCNC: 3.8 G/DL (ref 3.2–4.6)
ALBUMIN/GLOB SERPL: 0.8 {RATIO} (ref 1.2–3.5)
ALP SERPL-CCNC: 68 U/L (ref 50–136)
ALT SERPL-CCNC: 36 U/L (ref 12–65)
ANION GAP SERPL CALC-SCNC: 7 MMOL/L (ref 7–16)
AST SERPL-CCNC: 22 U/L (ref 15–37)
BASOPHILS # BLD: 0.1 K/UL (ref 0–0.2)
BASOPHILS NFR BLD: 1 % (ref 0–2)
BILIRUB SERPL-MCNC: 0.3 MG/DL (ref 0.2–1.1)
BUN SERPL-MCNC: 15 MG/DL (ref 8–23)
CALCIUM SERPL-MCNC: 9.4 MG/DL (ref 8.3–10.4)
CHLORIDE SERPL-SCNC: 112 MMOL/L (ref 98–107)
CO2 SERPL-SCNC: 22 MMOL/L (ref 21–32)
CREAT SERPL-MCNC: 1.3 MG/DL (ref 0.6–1)
DIFFERENTIAL METHOD BLD: ABNORMAL
EOSINOPHIL # BLD: 0.1 K/UL (ref 0–0.8)
EOSINOPHIL NFR BLD: 2 % (ref 0.5–7.8)
ERYTHROCYTE [DISTWIDTH] IN BLOOD BY AUTOMATED COUNT: 17.2 % (ref 11.9–14.6)
FERRITIN SERPL-MCNC: 17 NG/ML (ref 8–388)
GLOBULIN SER CALC-MCNC: 4.5 G/DL (ref 2.3–3.5)
GLUCOSE SERPL-MCNC: 101 MG/DL (ref 65–100)
HCT VFR BLD AUTO: 44.8 % (ref 35.8–46.3)
HGB BLD-MCNC: 14.2 G/DL (ref 11.7–15.4)
IMM GRANULOCYTES # BLD AUTO: 0 K/UL (ref 0–0.5)
IMM GRANULOCYTES NFR BLD AUTO: 0 % (ref 0–5)
IRON SATN MFR SERPL: 29 %
IRON SERPL-MCNC: 129 UG/DL (ref 35–150)
LYMPHOCYTES # BLD: 2.9 K/UL (ref 0.5–4.6)
LYMPHOCYTES NFR BLD: 36 % (ref 13–44)
MCH RBC QN AUTO: 26.5 PG (ref 26.1–32.9)
MCHC RBC AUTO-ENTMCNC: 31.7 G/DL (ref 31.4–35)
MCV RBC AUTO: 83.6 FL (ref 79.6–97.8)
MONOCYTES # BLD: 0.6 K/UL (ref 0.1–1.3)
MONOCYTES NFR BLD: 8 % (ref 4–12)
NEUTS SEG # BLD: 4.4 K/UL (ref 1.7–8.2)
NEUTS SEG NFR BLD: 54 % (ref 43–78)
NRBC # BLD: 0 K/UL (ref 0–0.2)
PLATELET # BLD AUTO: 493 K/UL (ref 150–450)
PMV BLD AUTO: 10.4 FL (ref 9.4–12.3)
POTASSIUM SERPL-SCNC: 3.9 MMOL/L (ref 3.5–5.1)
PROT SERPL-MCNC: 8.3 G/DL (ref 6.3–8.2)
RBC # BLD AUTO: 5.36 M/UL (ref 4.05–5.25)
SODIUM SERPL-SCNC: 141 MMOL/L (ref 136–145)
TIBC SERPL-MCNC: 440 UG/DL (ref 250–450)
WBC # BLD AUTO: 8 K/UL (ref 4.3–11.1)

## 2020-08-05 PROCEDURE — 82728 ASSAY OF FERRITIN: CPT

## 2020-08-05 PROCEDURE — 80053 COMPREHEN METABOLIC PANEL: CPT

## 2020-08-05 PROCEDURE — 83540 ASSAY OF IRON: CPT

## 2020-08-05 PROCEDURE — 85025 COMPLETE CBC W/AUTO DIFF WBC: CPT

## 2020-08-05 PROCEDURE — 36415 COLL VENOUS BLD VENIPUNCTURE: CPT

## 2020-08-18 ENCOUNTER — HOSPITAL ENCOUNTER (OUTPATIENT)
Dept: MAMMOGRAPHY | Age: 70
Discharge: HOME | End: 2020-08-18
Attending: INTERNAL MEDICINE
Payer: MEDICARE

## 2020-08-18 DIAGNOSIS — Z80.3 FAMILY HISTORY OF BREAST CANCER: ICD-10-CM

## 2020-08-18 DIAGNOSIS — Z12.31 ENCOUNTER FOR SCREENING MAMMOGRAM FOR HIGH-RISK PATIENT: ICD-10-CM

## 2020-08-18 DIAGNOSIS — R92.2 DENSE BREAST TISSUE ON MAMMOGRAM: ICD-10-CM

## 2020-08-18 PROCEDURE — 77063 BREAST TOMOSYNTHESIS BI: CPT

## 2020-08-20 ENCOUNTER — HOSPITAL ENCOUNTER (OUTPATIENT)
Dept: MRI IMAGING | Age: 70
Discharge: HOME | End: 2020-08-20
Attending: ORTHOPAEDIC SURGERY
Payer: MEDICARE

## 2020-08-20 DIAGNOSIS — M25.551 RIGHT HIP PAIN: ICD-10-CM

## 2020-08-20 PROCEDURE — 73721 MRI JNT OF LWR EXTRE W/O DYE: CPT

## 2020-09-10 ENCOUNTER — HOSPITAL ENCOUNTER (OUTPATIENT)
Dept: MRI IMAGING | Age: 70
Discharge: HOME | End: 2020-09-10
Attending: INTERNAL MEDICINE
Payer: MEDICARE

## 2020-09-10 DIAGNOSIS — R92.8 ABNORMAL MRI, BREAST: ICD-10-CM

## 2020-09-10 PROCEDURE — 77049 MRI BREAST C-+ W/CAD BI: CPT

## 2020-09-10 PROCEDURE — 74011250636 HC RX REV CODE- 250/636: Performed by: INTERNAL MEDICINE

## 2020-09-10 PROCEDURE — A9575 INJ GADOTERATE MEGLUMI 0.1ML: HCPCS | Performed by: INTERNAL MEDICINE

## 2020-09-10 PROCEDURE — 74011000258 HC RX REV CODE- 258: Performed by: INTERNAL MEDICINE

## 2020-09-10 RX ORDER — SODIUM CHLORIDE 0.9 % (FLUSH) 0.9 %
10 SYRINGE (ML) INJECTION
Status: COMPLETED | OUTPATIENT
Start: 2020-09-10 | End: 2020-09-10

## 2020-09-10 RX ORDER — GADOTERATE MEGLUMINE 376.9 MG/ML
20 INJECTION INTRAVENOUS
Status: COMPLETED | OUTPATIENT
Start: 2020-09-10 | End: 2020-09-10

## 2020-09-10 RX ADMIN — GADOTERATE MEGLUMINE 20 ML: 376.9 INJECTION INTRAVENOUS at 09:57

## 2020-09-10 RX ADMIN — SODIUM CHLORIDE 100 ML: 900 INJECTION, SOLUTION INTRAVENOUS at 09:58

## 2020-09-10 RX ADMIN — Medication 10 ML: at 09:57

## 2020-09-11 NOTE — PROGRESS NOTES
Will discuss results further at the following upcoming appointment:   Future Appointments  9/14/2020  11:00 AM   Yamilet Johnson MD         [facility]  11/5/2020  9:30 AM    [facility]     [facility]  11/5/2020  10:00 AM   Micah Hoffman MD       [facility]

## 2020-09-15 PROBLEM — Z80.3 FAMILY HISTORY OF BREAST CANCER: Status: ACTIVE | Noted: 2020-09-15

## 2020-11-05 ENCOUNTER — HOSPITAL ENCOUNTER (OUTPATIENT)
Dept: LAB | Age: 70
Discharge: HOME | End: 2020-11-05
Payer: MEDICARE

## 2020-11-05 DIAGNOSIS — E61.1 IRON DEFICIENCY: ICD-10-CM

## 2020-11-05 DIAGNOSIS — R79.89 HIGH PLATELET COUNT: ICD-10-CM

## 2020-11-05 LAB
ALBUMIN SERPL-MCNC: 3.7 G/DL (ref 3.2–4.6)
ALBUMIN/GLOB SERPL: 0.9 {RATIO} (ref 1.2–3.5)
ALP SERPL-CCNC: 69 U/L (ref 50–136)
ALT SERPL-CCNC: 24 U/L (ref 12–65)
ANION GAP SERPL CALC-SCNC: 8 MMOL/L (ref 7–16)
AST SERPL-CCNC: 14 U/L (ref 15–37)
BASOPHILS # BLD: 0.1 K/UL (ref 0–0.2)
BASOPHILS NFR BLD: 1 % (ref 0–2)
BILIRUB SERPL-MCNC: 0.7 MG/DL (ref 0.2–1.1)
BUN SERPL-MCNC: 17 MG/DL (ref 8–23)
CALCIUM SERPL-MCNC: 9.7 MG/DL (ref 8.3–10.4)
CHLORIDE SERPL-SCNC: 112 MMOL/L (ref 98–107)
CO2 SERPL-SCNC: 23 MMOL/L (ref 21–32)
CREAT SERPL-MCNC: 1.2 MG/DL (ref 0.6–1)
DIFFERENTIAL METHOD BLD: ABNORMAL
EOSINOPHIL # BLD: 0.1 K/UL (ref 0–0.8)
EOSINOPHIL NFR BLD: 1 % (ref 0.5–7.8)
ERYTHROCYTE [DISTWIDTH] IN BLOOD BY AUTOMATED COUNT: 15.8 % (ref 11.9–14.6)
FERRITIN SERPL-MCNC: 34 NG/ML (ref 8–388)
GLOBULIN SER CALC-MCNC: 4.1 G/DL (ref 2.3–3.5)
GLUCOSE SERPL-MCNC: 81 MG/DL (ref 65–100)
HCT VFR BLD AUTO: 43.1 % (ref 35.8–46.3)
HGB BLD-MCNC: 13.8 G/DL (ref 11.7–15.4)
IMM GRANULOCYTES # BLD AUTO: 0 K/UL (ref 0–0.5)
IMM GRANULOCYTES NFR BLD AUTO: 0 % (ref 0–5)
IRON SATN MFR SERPL: 16 %
IRON SERPL-MCNC: 66 UG/DL (ref 35–150)
LYMPHOCYTES # BLD: 2.4 K/UL (ref 0.5–4.6)
LYMPHOCYTES NFR BLD: 24 % (ref 13–44)
Lab: NORMAL
MCH RBC QN AUTO: 27 PG (ref 26.1–32.9)
MCHC RBC AUTO-ENTMCNC: 32 G/DL (ref 31.4–35)
MCV RBC AUTO: 84.2 FL (ref 79.6–97.8)
MONOCYTES # BLD: 0.7 K/UL (ref 0.1–1.3)
MONOCYTES NFR BLD: 7 % (ref 4–12)
NEUTS SEG # BLD: 6.8 K/UL (ref 1.7–8.2)
NEUTS SEG NFR BLD: 67 % (ref 43–78)
NRBC # BLD: 0 K/UL (ref 0–0.2)
PLATELET # BLD AUTO: 558 K/UL (ref 150–450)
PMV BLD AUTO: 10.3 FL (ref 9.4–12.3)
POTASSIUM SERPL-SCNC: 3.6 MMOL/L (ref 3.5–5.1)
PROT SERPL-MCNC: 7.8 G/DL (ref 6.3–8.2)
RBC # BLD AUTO: 5.12 M/UL (ref 4.05–5.25)
REFERENCE LAB,REFLB: NORMAL
SODIUM SERPL-SCNC: 143 MMOL/L (ref 136–145)
TEST DESCRIPTION:,ATST: NORMAL
TIBC SERPL-MCNC: 412 UG/DL (ref 250–450)
WBC # BLD AUTO: 10.1 K/UL (ref 4.3–11.1)

## 2020-11-05 PROCEDURE — 80053 COMPREHEN METABOLIC PANEL: CPT

## 2020-11-05 PROCEDURE — 83540 ASSAY OF IRON: CPT

## 2020-11-05 PROCEDURE — 82728 ASSAY OF FERRITIN: CPT

## 2020-11-05 PROCEDURE — 85025 COMPLETE CBC W/AUTO DIFF WBC: CPT

## 2020-11-05 PROCEDURE — 82784 ASSAY IGA/IGD/IGG/IGM EACH: CPT

## 2020-11-05 PROCEDURE — 36415 COLL VENOUS BLD VENIPUNCTURE: CPT

## 2020-11-06 LAB
GLIADIN PEPTIDE IGA SER-ACNC: 4 UNITS (ref 0–19)
GLIADIN PEPTIDE IGG SER-ACNC: 4 UNITS (ref 0–19)
IGA SERPL-MCNC: 223 MG/DL (ref 64–422)
TTG IGA SER-ACNC: 7 U/ML (ref 0–3)
TTG IGG SER-ACNC: <2 U/ML (ref 0–5)

## 2021-01-21 PROCEDURE — 88305 TISSUE EXAM BY PATHOLOGIST: CPT

## 2021-01-22 ENCOUNTER — HOSPITAL ENCOUNTER (OUTPATIENT)
Dept: LAB | Age: 71
Discharge: HOME | End: 2021-01-22

## 2021-04-05 ENCOUNTER — HOSPITAL ENCOUNTER (OUTPATIENT)
Dept: LAB | Age: 71
Discharge: HOME | End: 2021-04-05
Payer: MEDICARE

## 2021-04-05 DIAGNOSIS — D64.9 ANEMIA, UNSPECIFIED TYPE: ICD-10-CM

## 2021-04-05 DIAGNOSIS — E61.1 IRON DEFICIENCY: ICD-10-CM

## 2021-04-05 LAB
ALBUMIN SERPL-MCNC: 3.6 G/DL (ref 3.2–4.6)
ALBUMIN/GLOB SERPL: 0.9 {RATIO} (ref 1.2–3.5)
ALP SERPL-CCNC: 70 U/L (ref 50–136)
ALT SERPL-CCNC: 26 U/L (ref 12–65)
ANION GAP SERPL CALC-SCNC: 7 MMOL/L (ref 7–16)
AST SERPL-CCNC: 19 U/L (ref 15–37)
BASOPHILS # BLD: 0.1 K/UL (ref 0–0.2)
BASOPHILS NFR BLD: 1 % (ref 0–2)
BILIRUB SERPL-MCNC: 0.5 MG/DL (ref 0.2–1.1)
BUN SERPL-MCNC: 12 MG/DL (ref 8–23)
CALCIUM SERPL-MCNC: 9.7 MG/DL (ref 8.3–10.4)
CHLORIDE SERPL-SCNC: 111 MMOL/L (ref 98–107)
CO2 SERPL-SCNC: 24 MMOL/L (ref 21–32)
CREAT SERPL-MCNC: 1 MG/DL (ref 0.6–1)
DIFFERENTIAL METHOD BLD: ABNORMAL
EOSINOPHIL # BLD: 0.1 K/UL (ref 0–0.8)
EOSINOPHIL NFR BLD: 1 % (ref 0.5–7.8)
ERYTHROCYTE [DISTWIDTH] IN BLOOD BY AUTOMATED COUNT: 15.8 % (ref 11.9–14.6)
FERRITIN SERPL-MCNC: 23 NG/ML (ref 8–388)
GLOBULIN SER CALC-MCNC: 4.1 G/DL (ref 2.3–3.5)
GLUCOSE SERPL-MCNC: 98 MG/DL (ref 65–100)
HCT VFR BLD AUTO: 43 % (ref 35.8–46.3)
HGB BLD-MCNC: 13.3 G/DL (ref 11.7–15.4)
IMM GRANULOCYTES # BLD AUTO: 0 K/UL (ref 0–0.5)
IMM GRANULOCYTES NFR BLD AUTO: 0 % (ref 0–5)
IRON SATN MFR SERPL: 17 %
IRON SERPL-MCNC: 68 UG/DL (ref 35–150)
LYMPHOCYTES # BLD: 2.2 K/UL (ref 0.5–4.6)
LYMPHOCYTES NFR BLD: 26 % (ref 13–44)
MCH RBC QN AUTO: 26.9 PG (ref 26.1–32.9)
MCHC RBC AUTO-ENTMCNC: 30.9 G/DL (ref 31.4–35)
MCV RBC AUTO: 86.9 FL (ref 79.6–97.8)
MONOCYTES # BLD: 0.6 K/UL (ref 0.1–1.3)
MONOCYTES NFR BLD: 7 % (ref 4–12)
NEUTS SEG # BLD: 5.6 K/UL (ref 1.7–8.2)
NEUTS SEG NFR BLD: 66 % (ref 43–78)
NRBC # BLD: 0 K/UL (ref 0–0.2)
PLATELET # BLD AUTO: 492 K/UL (ref 150–450)
PMV BLD AUTO: 10.4 FL (ref 9.4–12.3)
POTASSIUM SERPL-SCNC: 3.9 MMOL/L (ref 3.5–5.1)
PROT SERPL-MCNC: 7.7 G/DL (ref 6.3–8.2)
RBC # BLD AUTO: 4.95 M/UL (ref 4.05–5.2)
SODIUM SERPL-SCNC: 142 MMOL/L (ref 136–145)
TIBC SERPL-MCNC: 401 UG/DL (ref 250–450)
WBC # BLD AUTO: 8.5 K/UL (ref 4.3–11.1)

## 2021-04-05 PROCEDURE — 82728 ASSAY OF FERRITIN: CPT

## 2021-04-05 PROCEDURE — 84238 ASSAY NONENDOCRINE RECEPTOR: CPT

## 2021-04-05 PROCEDURE — 83540 ASSAY OF IRON: CPT

## 2021-04-05 PROCEDURE — 80053 COMPREHEN METABOLIC PANEL: CPT

## 2021-04-05 PROCEDURE — 85025 COMPLETE CBC W/AUTO DIFF WBC: CPT

## 2021-04-05 PROCEDURE — 36415 COLL VENOUS BLD VENIPUNCTURE: CPT

## 2021-04-06 LAB — TRANSFERRIN SERPL-SCNC: 24.3 NMOL/L (ref 12.2–27.3)

## 2021-04-12 ENCOUNTER — HOSPITAL ENCOUNTER (OUTPATIENT)
Dept: INFUSION THERAPY | Age: 71
Discharge: HOME | End: 2021-04-12
Payer: MEDICARE

## 2021-04-12 VITALS
OXYGEN SATURATION: 98 % | DIASTOLIC BLOOD PRESSURE: 80 MMHG | TEMPERATURE: 98.5 F | SYSTOLIC BLOOD PRESSURE: 150 MMHG | RESPIRATION RATE: 16 BRPM | HEART RATE: 65 BPM

## 2021-04-12 DIAGNOSIS — E61.1 IRON DEFICIENCY: Primary | ICD-10-CM

## 2021-04-12 PROCEDURE — 96361 HYDRATE IV INFUSION ADD-ON: CPT

## 2021-04-12 PROCEDURE — 74011250636 HC RX REV CODE- 250/636: Performed by: NURSE PRACTITIONER

## 2021-04-12 PROCEDURE — 96374 THER/PROPH/DIAG INJ IV PUSH: CPT

## 2021-04-12 PROCEDURE — 74011000258 HC RX REV CODE- 258: Performed by: NURSE PRACTITIONER

## 2021-04-12 RX ORDER — HYDROCORTISONE SODIUM SUCCINATE 100 MG/2ML
100 INJECTION, POWDER, FOR SOLUTION INTRAMUSCULAR; INTRAVENOUS AS NEEDED
Status: CANCELLED | OUTPATIENT
Start: 2021-04-19

## 2021-04-12 RX ORDER — HEPARIN 100 UNIT/ML
300-500 SYRINGE INTRAVENOUS AS NEEDED
Status: CANCELLED
Start: 2021-04-12

## 2021-04-12 RX ORDER — HEPARIN 100 UNIT/ML
300-500 SYRINGE INTRAVENOUS AS NEEDED
Status: CANCELLED
Start: 2021-04-19

## 2021-04-12 RX ORDER — DIPHENHYDRAMINE HYDROCHLORIDE 50 MG/ML
25 INJECTION, SOLUTION INTRAMUSCULAR; INTRAVENOUS AS NEEDED
Status: CANCELLED
Start: 2021-04-19

## 2021-04-12 RX ORDER — ALBUTEROL SULFATE 0.83 MG/ML
2.5 SOLUTION RESPIRATORY (INHALATION) AS NEEDED
Status: CANCELLED
Start: 2021-04-12

## 2021-04-12 RX ORDER — EPINEPHRINE 1 MG/ML
0.3 INJECTION, SOLUTION, CONCENTRATE INTRAVENOUS AS NEEDED
Status: CANCELLED | OUTPATIENT
Start: 2021-04-19

## 2021-04-12 RX ORDER — ACETAMINOPHEN 325 MG/1
650 TABLET ORAL AS NEEDED
Status: CANCELLED
Start: 2021-04-19

## 2021-04-12 RX ORDER — SODIUM CHLORIDE 9 MG/ML
10 INJECTION INTRAMUSCULAR; INTRAVENOUS; SUBCUTANEOUS AS NEEDED
Status: CANCELLED | OUTPATIENT
Start: 2021-04-19

## 2021-04-12 RX ORDER — ONDANSETRON 2 MG/ML
8 INJECTION INTRAMUSCULAR; INTRAVENOUS AS NEEDED
Status: CANCELLED | OUTPATIENT
Start: 2021-04-19

## 2021-04-12 RX ORDER — DIPHENHYDRAMINE HYDROCHLORIDE 50 MG/ML
50 INJECTION, SOLUTION INTRAMUSCULAR; INTRAVENOUS AS NEEDED
Status: CANCELLED
Start: 2021-04-12

## 2021-04-12 RX ORDER — ACETAMINOPHEN 325 MG/1
650 TABLET ORAL AS NEEDED
Status: CANCELLED
Start: 2021-04-12

## 2021-04-12 RX ORDER — SODIUM CHLORIDE 0.9 % (FLUSH) 0.9 %
10 SYRINGE (ML) INJECTION AS NEEDED
Status: CANCELLED | OUTPATIENT
Start: 2021-04-19

## 2021-04-12 RX ORDER — DIPHENHYDRAMINE HYDROCHLORIDE 50 MG/ML
25 INJECTION, SOLUTION INTRAMUSCULAR; INTRAVENOUS AS NEEDED
Status: CANCELLED
Start: 2021-04-12

## 2021-04-12 RX ORDER — ONDANSETRON 2 MG/ML
8 INJECTION INTRAMUSCULAR; INTRAVENOUS AS NEEDED
Status: CANCELLED | OUTPATIENT
Start: 2021-04-12

## 2021-04-12 RX ORDER — HYDROCORTISONE SODIUM SUCCINATE 100 MG/2ML
100 INJECTION, POWDER, FOR SOLUTION INTRAMUSCULAR; INTRAVENOUS AS NEEDED
Status: CANCELLED | OUTPATIENT
Start: 2021-04-12

## 2021-04-12 RX ORDER — SODIUM CHLORIDE 0.9 % (FLUSH) 0.9 %
10 SYRINGE (ML) INJECTION AS NEEDED
Status: ACTIVE | OUTPATIENT
Start: 2021-04-12 | End: 2021-04-12

## 2021-04-12 RX ORDER — EPINEPHRINE 1 MG/ML
0.3 INJECTION, SOLUTION, CONCENTRATE INTRAVENOUS AS NEEDED
Status: CANCELLED | OUTPATIENT
Start: 2021-04-12

## 2021-04-12 RX ORDER — DIPHENHYDRAMINE HYDROCHLORIDE 50 MG/ML
50 INJECTION, SOLUTION INTRAMUSCULAR; INTRAVENOUS AS NEEDED
Status: CANCELLED
Start: 2021-04-19

## 2021-04-12 RX ORDER — SODIUM CHLORIDE 9 MG/ML
10 INJECTION INTRAMUSCULAR; INTRAVENOUS; SUBCUTANEOUS AS NEEDED
Status: CANCELLED | OUTPATIENT
Start: 2021-04-12

## 2021-04-12 RX ORDER — ALBUTEROL SULFATE 0.83 MG/ML
2.5 SOLUTION RESPIRATORY (INHALATION) AS NEEDED
Status: CANCELLED
Start: 2021-04-19

## 2021-04-12 RX ADMIN — SODIUM CHLORIDE 500 ML: 900 INJECTION, SOLUTION INTRAVENOUS at 08:45

## 2021-04-12 RX ADMIN — Medication 10 ML: at 10:05

## 2021-04-12 RX ADMIN — Medication 10 ML: at 08:45

## 2021-04-12 RX ADMIN — FERUMOXYTOL 510 MG: 510 INJECTION INTRAVENOUS at 09:20

## 2021-04-12 NOTE — PROGRESS NOTES
Arrived to the [facility]. Nuvia completed. Patient tolerated well. Any issues or concerns during appointment: no.  Patient aware of next infusion appointment on 4/19/2021 (date) at 8:30 am (time). Discharged ambulatory with self.

## 2021-04-19 ENCOUNTER — HOSPITAL ENCOUNTER (OUTPATIENT)
Dept: INFUSION THERAPY | Age: 71
Discharge: HOME | End: 2021-04-19
Payer: MEDICARE

## 2021-04-19 VITALS
RESPIRATION RATE: 18 BRPM | WEIGHT: 228.2 LBS | BODY MASS INDEX: 36.83 KG/M2 | HEART RATE: 77 BPM | OXYGEN SATURATION: 97 % | DIASTOLIC BLOOD PRESSURE: 93 MMHG | SYSTOLIC BLOOD PRESSURE: 184 MMHG | TEMPERATURE: 98.2 F

## 2021-04-19 DIAGNOSIS — E61.1 IRON DEFICIENCY: Primary | ICD-10-CM

## 2021-04-19 PROCEDURE — 74011250636 HC RX REV CODE- 250/636: Performed by: NURSE PRACTITIONER

## 2021-04-19 PROCEDURE — 74011000258 HC RX REV CODE- 258: Performed by: NURSE PRACTITIONER

## 2021-04-19 PROCEDURE — 96365 THER/PROPH/DIAG IV INF INIT: CPT

## 2021-04-19 RX ORDER — HYDROCORTISONE SODIUM SUCCINATE 100 MG/2ML
100 INJECTION, POWDER, FOR SOLUTION INTRAMUSCULAR; INTRAVENOUS AS NEEDED
Status: ACTIVE | OUTPATIENT
Start: 2021-04-19 | End: 2021-04-19

## 2021-04-19 RX ORDER — SODIUM CHLORIDE 9 MG/ML
10 INJECTION INTRAMUSCULAR; INTRAVENOUS; SUBCUTANEOUS AS NEEDED
Status: ACTIVE | OUTPATIENT
Start: 2021-04-19 | End: 2021-04-19

## 2021-04-19 RX ORDER — DIPHENHYDRAMINE HYDROCHLORIDE 50 MG/ML
50 INJECTION, SOLUTION INTRAMUSCULAR; INTRAVENOUS AS NEEDED
Status: ACTIVE | OUTPATIENT
Start: 2021-04-19 | End: 2021-04-19

## 2021-04-19 RX ADMIN — SODIUM CHLORIDE 500 ML: 900 INJECTION, SOLUTION INTRAVENOUS at 09:33

## 2021-04-19 RX ADMIN — FERUMOXYTOL 510 MG: 510 INJECTION INTRAVENOUS at 09:16

## 2021-04-19 RX ADMIN — SODIUM CHLORIDE 10 ML: 9 INJECTION INTRAMUSCULAR; INTRAVENOUS; SUBCUTANEOUS at 08:45

## 2021-04-19 NOTE — PROGRESS NOTES
Arrived to the [facility]. Nuvia completed. Patient tolerated well. Any issues or concerns during appointment: patient's blood pressure 183/94 this morning. Patient forgot to take her BP meds today, reminded to take them as soon as she gets home from appt. Verbalizes understanding. Patient to follow up with MD regarding any future appts. Discharged ambulatory to home.

## 2021-04-26 ENCOUNTER — HOSPITAL ENCOUNTER (OUTPATIENT)
Dept: LAB | Age: 71
Discharge: HOME | End: 2021-04-26
Payer: MEDICARE

## 2021-04-26 DIAGNOSIS — D64.9 ANEMIA, UNSPECIFIED TYPE: ICD-10-CM

## 2021-04-26 LAB
ALBUMIN SERPL-MCNC: 3.6 G/DL (ref 3.2–4.6)
ALBUMIN/GLOB SERPL: 0.9 {RATIO} (ref 1.2–3.5)
ALP SERPL-CCNC: 83 U/L (ref 50–136)
ALT SERPL-CCNC: 45 U/L (ref 12–65)
ANION GAP SERPL CALC-SCNC: 8 MMOL/L (ref 7–16)
AST SERPL-CCNC: 24 U/L (ref 15–37)
BASOPHILS # BLD: 0.1 K/UL (ref 0–0.2)
BASOPHILS NFR BLD: 1 % (ref 0–2)
BILIRUB SERPL-MCNC: 0.3 MG/DL (ref 0.2–1.1)
BUN SERPL-MCNC: 11 MG/DL (ref 8–23)
CALCIUM SERPL-MCNC: 9.6 MG/DL (ref 8.3–10.4)
CHLORIDE SERPL-SCNC: 112 MMOL/L (ref 98–107)
CO2 SERPL-SCNC: 22 MMOL/L (ref 21–32)
CREAT SERPL-MCNC: 0.7 MG/DL (ref 0.6–1)
DIFFERENTIAL METHOD BLD: ABNORMAL
EOSINOPHIL # BLD: 0.1 K/UL (ref 0–0.8)
EOSINOPHIL NFR BLD: 1 % (ref 0.5–7.8)
ERYTHROCYTE [DISTWIDTH] IN BLOOD BY AUTOMATED COUNT: 17.7 % (ref 11.9–14.6)
FERRITIN SERPL-MCNC: 898 NG/ML (ref 8–388)
GLOBULIN SER CALC-MCNC: 4 G/DL (ref 2.3–3.5)
GLUCOSE SERPL-MCNC: 104 MG/DL (ref 65–100)
HCT VFR BLD AUTO: 42.7 % (ref 35.8–46.3)
HGB BLD-MCNC: 13.3 G/DL (ref 11.7–15.4)
IMM GRANULOCYTES # BLD AUTO: 0 K/UL (ref 0–0.5)
IMM GRANULOCYTES NFR BLD AUTO: 0 % (ref 0–5)
IRON SATN MFR SERPL: 18 %
IRON SERPL-MCNC: 62 UG/DL (ref 35–150)
LYMPHOCYTES # BLD: 2.1 K/UL (ref 0.5–4.6)
LYMPHOCYTES NFR BLD: 24 % (ref 13–44)
MCH RBC QN AUTO: 27.4 PG (ref 26.1–32.9)
MCHC RBC AUTO-ENTMCNC: 31.1 G/DL (ref 31.4–35)
MCV RBC AUTO: 88 FL (ref 79.6–97.8)
MONOCYTES # BLD: 0.6 K/UL (ref 0.1–1.3)
MONOCYTES NFR BLD: 6 % (ref 4–12)
NEUTS SEG # BLD: 5.7 K/UL (ref 1.7–8.2)
NEUTS SEG NFR BLD: 68 % (ref 43–78)
NRBC # BLD: 0 K/UL (ref 0–0.2)
PLATELET # BLD AUTO: 388 K/UL (ref 150–450)
PMV BLD AUTO: 10.4 FL (ref 9.4–12.3)
POTASSIUM SERPL-SCNC: 3.4 MMOL/L (ref 3.5–5.1)
PROT SERPL-MCNC: 7.6 G/DL (ref 6.3–8.2)
RBC # BLD AUTO: 4.85 M/UL (ref 4.05–5.2)
SODIUM SERPL-SCNC: 142 MMOL/L (ref 136–145)
TIBC SERPL-MCNC: 341 UG/DL (ref 250–450)
WBC # BLD AUTO: 8.5 K/UL (ref 4.3–11.1)

## 2021-04-26 PROCEDURE — 85025 COMPLETE CBC W/AUTO DIFF WBC: CPT

## 2021-04-26 PROCEDURE — 36415 COLL VENOUS BLD VENIPUNCTURE: CPT

## 2021-04-26 PROCEDURE — 83540 ASSAY OF IRON: CPT

## 2021-04-26 PROCEDURE — 80053 COMPREHEN METABOLIC PANEL: CPT

## 2021-04-26 PROCEDURE — 82728 ASSAY OF FERRITIN: CPT

## 2021-08-03 PROBLEM — I10 ESSENTIAL HYPERTENSION: Status: RESOLVED | Noted: 2021-08-03 | Resolved: 2021-08-03

## 2021-08-16 ENCOUNTER — HOSPITAL ENCOUNTER (OUTPATIENT)
Dept: LAB | Age: 71
Discharge: HOME | End: 2021-08-16
Payer: MEDICARE

## 2021-08-16 DIAGNOSIS — D75.839 THROMBOCYTOSIS: ICD-10-CM

## 2021-08-16 LAB
ALBUMIN SERPL-MCNC: 3.4 G/DL (ref 3.2–4.6)
ALBUMIN/GLOB SERPL: 0.7 {RATIO} (ref 1.2–3.5)
ALP SERPL-CCNC: 81 U/L (ref 50–136)
ALT SERPL-CCNC: 27 U/L (ref 12–65)
ANION GAP SERPL CALC-SCNC: 5 MMOL/L (ref 7–16)
AST SERPL-CCNC: 7 U/L (ref 15–37)
BASOPHILS # BLD: 0.1 K/UL (ref 0–0.2)
BASOPHILS NFR BLD: 1 % (ref 0–2)
BILIRUB SERPL-MCNC: 0.4 MG/DL (ref 0.2–1.1)
BUN SERPL-MCNC: 16 MG/DL (ref 8–23)
CALCIUM SERPL-MCNC: 9.1 MG/DL (ref 8.3–10.4)
CHLORIDE SERPL-SCNC: 113 MMOL/L (ref 98–107)
CO2 SERPL-SCNC: 24 MMOL/L (ref 21–32)
CREAT SERPL-MCNC: 0.9 MG/DL (ref 0.6–1)
DIFFERENTIAL METHOD BLD: ABNORMAL
EOSINOPHIL # BLD: 0.2 K/UL (ref 0–0.8)
EOSINOPHIL NFR BLD: 2 % (ref 0.5–7.8)
ERYTHROCYTE [DISTWIDTH] IN BLOOD BY AUTOMATED COUNT: 15.4 % (ref 11.9–14.6)
FERRITIN SERPL-MCNC: 171 NG/ML (ref 8–388)
GLOBULIN SER CALC-MCNC: 4.6 G/DL (ref 2.3–3.5)
GLUCOSE SERPL-MCNC: 102 MG/DL (ref 65–100)
HCT VFR BLD AUTO: 44.2 % (ref 35.8–46.3)
HGB BLD-MCNC: 14.5 G/DL (ref 11.7–15.4)
IMM GRANULOCYTES # BLD AUTO: 0 K/UL (ref 0–0.5)
IMM GRANULOCYTES NFR BLD AUTO: 0 % (ref 0–5)
IRON SATN MFR SERPL: 15 %
IRON SERPL-MCNC: 45 UG/DL (ref 35–150)
LYMPHOCYTES # BLD: 2 K/UL (ref 0.5–4.6)
LYMPHOCYTES NFR BLD: 22 % (ref 13–44)
MCH RBC QN AUTO: 28.8 PG (ref 26.1–32.9)
MCHC RBC AUTO-ENTMCNC: 32.8 G/DL (ref 31.4–35)
MCV RBC AUTO: 87.7 FL (ref 79.6–97.8)
MONOCYTES # BLD: 0.7 K/UL (ref 0.1–1.3)
MONOCYTES NFR BLD: 7 % (ref 4–12)
NEUTS SEG # BLD: 6.1 K/UL (ref 1.7–8.2)
NEUTS SEG NFR BLD: 68 % (ref 43–78)
NRBC # BLD: 0 K/UL (ref 0–0.2)
PLATELET # BLD AUTO: 417 K/UL (ref 150–450)
PMV BLD AUTO: 10.2 FL (ref 9.4–12.3)
POTASSIUM SERPL-SCNC: 3.9 MMOL/L (ref 3.5–5.1)
PROT SERPL-MCNC: 8 G/DL (ref 6.3–8.2)
RBC # BLD AUTO: 5.04 M/UL (ref 4.05–5.2)
SODIUM SERPL-SCNC: 142 MMOL/L (ref 136–145)
TIBC SERPL-MCNC: 308 UG/DL (ref 250–450)
WBC # BLD AUTO: 9 K/UL (ref 4.3–11.1)

## 2021-08-16 PROCEDURE — 36415 COLL VENOUS BLD VENIPUNCTURE: CPT

## 2021-08-16 PROCEDURE — 83540 ASSAY OF IRON: CPT

## 2021-08-16 PROCEDURE — 82728 ASSAY OF FERRITIN: CPT

## 2021-08-16 PROCEDURE — 80053 COMPREHEN METABOLIC PANEL: CPT

## 2021-08-16 PROCEDURE — 85025 COMPLETE CBC W/AUTO DIFF WBC: CPT

## 2022-01-04 ENCOUNTER — HOSPITAL ENCOUNTER (OUTPATIENT)
Dept: LAB | Age: 72
Discharge: HOME | End: 2022-01-04
Payer: MEDICARE

## 2022-01-04 DIAGNOSIS — E61.1 IRON DEFICIENCY: ICD-10-CM

## 2022-01-04 LAB
ALBUMIN SERPL-MCNC: 3.6 G/DL (ref 3.2–4.6)
ALBUMIN/GLOB SERPL: 0.8 {RATIO} (ref 1.2–3.5)
ALP SERPL-CCNC: 95 U/L (ref 50–136)
ALT SERPL-CCNC: 28 U/L (ref 12–65)
ANION GAP SERPL CALC-SCNC: 7 MMOL/L (ref 7–16)
AST SERPL-CCNC: 17 U/L (ref 15–37)
BASOPHILS # BLD: 0.1 K/UL (ref 0–0.2)
BASOPHILS NFR BLD: 1 % (ref 0–2)
BILIRUB SERPL-MCNC: 0.5 MG/DL (ref 0.2–1.1)
BUN SERPL-MCNC: 13 MG/DL (ref 8–23)
CALCIUM SERPL-MCNC: 9.5 MG/DL (ref 8.3–10.4)
CHLORIDE SERPL-SCNC: 110 MMOL/L (ref 98–107)
CO2 SERPL-SCNC: 24 MMOL/L (ref 21–32)
CREAT SERPL-MCNC: 1 MG/DL (ref 0.6–1)
DIFFERENTIAL METHOD BLD: ABNORMAL
EOSINOPHIL # BLD: 0.1 K/UL (ref 0–0.8)
EOSINOPHIL NFR BLD: 2 % (ref 0.5–7.8)
ERYTHROCYTE [DISTWIDTH] IN BLOOD BY AUTOMATED COUNT: 15 % (ref 11.9–14.6)
FERRITIN SERPL-MCNC: 125 NG/ML (ref 8–388)
GLOBULIN SER CALC-MCNC: 4.3 G/DL (ref 2.3–3.5)
GLUCOSE SERPL-MCNC: 100 MG/DL (ref 65–100)
HCT VFR BLD AUTO: 45.6 % (ref 35.8–46.3)
HGB BLD-MCNC: 14.5 G/DL (ref 11.7–15.4)
IMM GRANULOCYTES # BLD AUTO: 0 K/UL (ref 0–0.5)
IMM GRANULOCYTES NFR BLD AUTO: 0 % (ref 0–5)
IRON SATN MFR SERPL: 19 %
IRON SERPL-MCNC: 65 UG/DL (ref 35–150)
LYMPHOCYTES # BLD: 2.3 K/UL (ref 0.5–4.6)
LYMPHOCYTES NFR BLD: 29 % (ref 13–44)
MCH RBC QN AUTO: 27.6 PG (ref 26.1–32.9)
MCHC RBC AUTO-ENTMCNC: 31.8 G/DL (ref 31.4–35)
MCV RBC AUTO: 86.9 FL (ref 79.6–97.8)
MONOCYTES # BLD: 0.5 K/UL (ref 0.1–1.3)
MONOCYTES NFR BLD: 6 % (ref 4–12)
NEUTS SEG # BLD: 4.8 K/UL (ref 1.7–8.2)
NEUTS SEG NFR BLD: 62 % (ref 43–78)
NRBC # BLD: 0 K/UL (ref 0–0.2)
PLATELET # BLD AUTO: 417 K/UL (ref 150–450)
PMV BLD AUTO: 10.3 FL (ref 9.4–12.3)
POTASSIUM SERPL-SCNC: 3.8 MMOL/L (ref 3.5–5.1)
PROT SERPL-MCNC: 7.9 G/DL (ref 6.3–8.2)
RBC # BLD AUTO: 5.25 M/UL (ref 4.05–5.2)
SODIUM SERPL-SCNC: 141 MMOL/L (ref 136–145)
TIBC SERPL-MCNC: 339 UG/DL (ref 250–450)
WBC # BLD AUTO: 7.7 K/UL (ref 4.3–11.1)

## 2022-01-04 PROCEDURE — 85025 COMPLETE CBC W/AUTO DIFF WBC: CPT

## 2022-01-04 PROCEDURE — 83540 ASSAY OF IRON: CPT

## 2022-01-04 PROCEDURE — 36415 COLL VENOUS BLD VENIPUNCTURE: CPT

## 2022-01-04 PROCEDURE — 80053 COMPREHEN METABOLIC PANEL: CPT

## 2022-01-04 PROCEDURE — 82728 ASSAY OF FERRITIN: CPT

## 2022-02-11 ENCOUNTER — TRANSCRIBE ORDER (OUTPATIENT)
Dept: SCHEDULING | Age: 72
End: 2022-02-11

## 2022-02-11 DIAGNOSIS — M54.16 LUMBAR RADICULOPATHY: Primary | ICD-10-CM

## 2022-02-21 ENCOUNTER — TRANSCRIBE ORDER (OUTPATIENT)
Dept: SCHEDULING | Age: 72
End: 2022-02-21

## 2022-02-21 DIAGNOSIS — M54.16 LUMBAR RADICULOPATHY: Primary | ICD-10-CM

## 2022-02-23 ENCOUNTER — HOSPITAL ENCOUNTER (OUTPATIENT)
Dept: MRI IMAGING | Age: 72
Discharge: HOME | End: 2022-02-23
Attending: NURSE PRACTITIONER
Payer: MEDICARE

## 2022-02-23 DIAGNOSIS — M54.16 LUMBAR RADICULOPATHY: ICD-10-CM

## 2022-02-23 PROCEDURE — 72158 MRI LUMBAR SPINE W/O & W/DYE: CPT

## 2022-02-23 PROCEDURE — 74011250636 HC RX REV CODE- 250/636

## 2022-02-23 PROCEDURE — A9576 INJ PROHANCE MULTIPACK: HCPCS

## 2022-02-23 RX ORDER — SODIUM CHLORIDE 0.9 % (FLUSH) 0.9 %
10 SYRINGE (ML) INJECTION
Status: COMPLETED | OUTPATIENT
Start: 2022-02-23 | End: 2022-02-23

## 2022-02-23 RX ADMIN — GADOTERIDOL 20 ML: 279.3 INJECTION, SOLUTION INTRAVENOUS at 11:58

## 2022-02-23 RX ADMIN — Medication 10 ML: at 11:59

## 2022-03-18 PROBLEM — K63.5 COLON POLYPS: Status: ACTIVE | Noted: 2017-07-26

## 2022-03-18 PROBLEM — R55 SYNCOPE: Status: ACTIVE | Noted: 2017-07-14

## 2022-03-18 PROBLEM — E55.9 VITAMIN D DEFICIENCY: Status: ACTIVE | Noted: 2017-05-16

## 2022-03-18 PROBLEM — F32.A DEPRESSION: Status: ACTIVE | Noted: 2017-09-18

## 2022-03-18 PROBLEM — E61.1 IRON DEFICIENCY: Status: ACTIVE | Noted: 2019-11-12

## 2022-03-19 PROBLEM — R00.2 PALPITATIONS: Status: ACTIVE | Noted: 2017-07-14

## 2022-03-19 PROBLEM — Z80.3 FAMILY HISTORY OF BREAST CANCER: Status: ACTIVE | Noted: 2020-09-15

## 2022-03-19 PROBLEM — I42.2 HYPERTROPHIC CARDIOMYOPATHY (HCC): Status: ACTIVE | Noted: 2017-11-21

## 2022-03-19 PROBLEM — M51.9 LUMBAR DISC DISEASE: Status: ACTIVE | Noted: 2017-08-18

## 2022-03-19 PROBLEM — K44.9 HIATAL HERNIA: Status: ACTIVE | Noted: 2018-12-07

## 2022-03-19 PROBLEM — I87.2 VENOUS INSUFFICIENCY: Status: ACTIVE | Noted: 2020-03-17

## 2022-03-19 PROBLEM — K25.7: Status: ACTIVE | Noted: 2018-12-07

## 2022-03-19 PROBLEM — E66.01 SEVERE OBESITY (HCC): Status: ACTIVE | Noted: 2019-11-12

## 2022-07-14 DIAGNOSIS — E61.1 IRON DEFICIENCY: Primary | ICD-10-CM

## 2022-07-20 ENCOUNTER — TELEPHONE (OUTPATIENT)
Dept: ONCOLOGY | Age: 72
End: 2022-07-20

## 2022-07-20 NOTE — TELEPHONE ENCOUNTER
Called home number, spoke to patient. Rescheduled and confirmed new appointment date/times. Patient also wanted to see about rescheduling 's appointment to a morning time. Checked Dr. Bryson Sorto' schedule but he is rounding. Patient will keep 's appointment as scheduled.

## 2022-08-11 ENCOUNTER — OFFICE VISIT (OUTPATIENT)
Dept: ONCOLOGY | Age: 72
End: 2022-08-11
Payer: MEDICARE

## 2022-08-11 ENCOUNTER — HOSPITAL ENCOUNTER (OUTPATIENT)
Dept: LAB | Age: 72
Discharge: HOME | End: 2022-08-14
Payer: MEDICARE

## 2022-08-11 VITALS
RESPIRATION RATE: 14 BRPM | HEART RATE: 74 BPM | WEIGHT: 232 LBS | BODY MASS INDEX: 37.45 KG/M2 | SYSTOLIC BLOOD PRESSURE: 188 MMHG | OXYGEN SATURATION: 97 % | TEMPERATURE: 98.3 F | DIASTOLIC BLOOD PRESSURE: 92 MMHG

## 2022-08-11 DIAGNOSIS — E61.1 IRON DEFICIENCY: Primary | ICD-10-CM

## 2022-08-11 DIAGNOSIS — E61.1 IRON DEFICIENCY: ICD-10-CM

## 2022-08-11 LAB
ALBUMIN SERPL-MCNC: 3.4 G/DL (ref 3.2–4.6)
ALBUMIN/GLOB SERPL: 0.8 {RATIO} (ref 1.2–3.5)
ALP SERPL-CCNC: 75 U/L (ref 50–136)
ALT SERPL-CCNC: 28 U/L (ref 12–65)
ANION GAP SERPL CALC-SCNC: 8 MMOL/L (ref 7–16)
AST SERPL-CCNC: 20 U/L (ref 15–37)
BASOPHILS # BLD: 0.1 K/UL (ref 0–0.2)
BASOPHILS NFR BLD: 1 % (ref 0–2)
BILIRUB SERPL-MCNC: 0.4 MG/DL (ref 0.2–1.1)
BUN SERPL-MCNC: 10 MG/DL (ref 8–23)
CALCIUM SERPL-MCNC: 9.4 MG/DL (ref 8.3–10.4)
CHLORIDE SERPL-SCNC: 112 MMOL/L (ref 98–107)
CO2 SERPL-SCNC: 23 MMOL/L (ref 21–32)
CREAT SERPL-MCNC: 0.9 MG/DL (ref 0.6–1)
DIFFERENTIAL METHOD BLD: ABNORMAL
EOSINOPHIL # BLD: 0.1 K/UL (ref 0–0.8)
EOSINOPHIL NFR BLD: 1 % (ref 0.5–7.8)
ERYTHROCYTE [DISTWIDTH] IN BLOOD BY AUTOMATED COUNT: 15.4 % (ref 11.9–14.6)
FERRITIN SERPL-MCNC: 61 NG/ML (ref 8–388)
GLOBULIN SER CALC-MCNC: 4.2 G/DL (ref 2.3–3.5)
GLUCOSE SERPL-MCNC: 108 MG/DL (ref 65–100)
HCT VFR BLD AUTO: 41.9 % (ref 35.8–46.3)
HGB BLD-MCNC: 13.1 G/DL (ref 11.7–15.4)
IMM GRANULOCYTES # BLD AUTO: 0 K/UL (ref 0–0.5)
IMM GRANULOCYTES NFR BLD AUTO: 1 % (ref 0–5)
IRON SATN MFR SERPL: 19 %
IRON SERPL-MCNC: 63 UG/DL (ref 35–150)
LYMPHOCYTES # BLD: 1.9 K/UL (ref 0.5–4.6)
LYMPHOCYTES NFR BLD: 24 % (ref 13–44)
MCH RBC QN AUTO: 27.3 PG (ref 26.1–32.9)
MCHC RBC AUTO-ENTMCNC: 31.3 G/DL (ref 31.4–35)
MCV RBC AUTO: 87.3 FL (ref 79.6–97.8)
MONOCYTES # BLD: 0.4 K/UL (ref 0.1–1.3)
MONOCYTES NFR BLD: 6 % (ref 4–12)
NEUTS SEG # BLD: 5.3 K/UL (ref 1.7–8.2)
NEUTS SEG NFR BLD: 67 % (ref 43–78)
NRBC # BLD: 0 K/UL (ref 0–0.2)
PLATELET # BLD AUTO: 425 K/UL (ref 150–450)
PMV BLD AUTO: 10.4 FL (ref 9.4–12.3)
POTASSIUM SERPL-SCNC: 4 MMOL/L (ref 3.5–5.1)
PROT SERPL-MCNC: 7.6 G/DL (ref 6.3–8.2)
RBC # BLD AUTO: 4.8 M/UL (ref 4.05–5.2)
SODIUM SERPL-SCNC: 143 MMOL/L (ref 136–145)
TIBC SERPL-MCNC: 336 UG/DL (ref 250–450)
WBC # BLD AUTO: 7.8 K/UL (ref 4.3–11.1)

## 2022-08-11 PROCEDURE — 1036F TOBACCO NON-USER: CPT | Performed by: INTERNAL MEDICINE

## 2022-08-11 PROCEDURE — 80053 COMPREHEN METABOLIC PANEL: CPT

## 2022-08-11 PROCEDURE — 82728 ASSAY OF FERRITIN: CPT

## 2022-08-11 PROCEDURE — 3017F COLORECTAL CA SCREEN DOC REV: CPT | Performed by: INTERNAL MEDICINE

## 2022-08-11 PROCEDURE — G8417 CALC BMI ABV UP PARAM F/U: HCPCS | Performed by: INTERNAL MEDICINE

## 2022-08-11 PROCEDURE — 83540 ASSAY OF IRON: CPT

## 2022-08-11 PROCEDURE — G8399 PT W/DXA RESULTS DOCUMENT: HCPCS | Performed by: INTERNAL MEDICINE

## 2022-08-11 PROCEDURE — 36415 COLL VENOUS BLD VENIPUNCTURE: CPT

## 2022-08-11 PROCEDURE — 1123F ACP DISCUSS/DSCN MKR DOCD: CPT | Performed by: INTERNAL MEDICINE

## 2022-08-11 PROCEDURE — 99213 OFFICE O/P EST LOW 20 MIN: CPT | Performed by: INTERNAL MEDICINE

## 2022-08-11 PROCEDURE — 85025 COMPLETE CBC W/AUTO DIFF WBC: CPT

## 2022-08-11 PROCEDURE — G8427 DOCREV CUR MEDS BY ELIG CLIN: HCPCS | Performed by: INTERNAL MEDICINE

## 2022-08-11 PROCEDURE — 1090F PRES/ABSN URINE INCON ASSESS: CPT | Performed by: INTERNAL MEDICINE

## 2022-08-11 ASSESSMENT — PATIENT HEALTH QUESTIONNAIRE - PHQ9
SUM OF ALL RESPONSES TO PHQ QUESTIONS 1-9: 0
2. FEELING DOWN, DEPRESSED OR HOPELESS: 0
SUM OF ALL RESPONSES TO PHQ QUESTIONS 1-9: 0

## 2022-08-11 NOTE — PATIENT INSTRUCTIONS
Patient Instructions from Today's Visit    Reason for Visit:  Follow up - Anemia      Plan:  - labs reviewed and all look good!      Follow Up:  - As Needed    Recent Lab Results:  Hospital Outpatient Visit on 08/11/2022   Component Date Value Ref Range Status    WBC 08/11/2022 7.8  4.3 - 11.1 K/uL Final    RBC 08/11/2022 4.80  4.05 - 5.2 M/uL Final    Hemoglobin 08/11/2022 13.1  11.7 - 15.4 g/dL Final    Hematocrit 08/11/2022 41.9  35.8 - 46.3 % Final    MCV 08/11/2022 87.3  79.6 - 97.8 FL Final    MCH 08/11/2022 27.3  26.1 - 32.9 PG Final    MCHC 08/11/2022 31.3 (A) 31.4 - 35.0 g/dL Final    RDW 08/11/2022 15.4 (A) 11.9 - 14.6 % Final    Platelets 63/35/7667 425  150 - 450 K/uL Final    MPV 08/11/2022 10.4  9.4 - 12.3 FL Final    nRBC 08/11/2022 0.00  0.0 - 0.2 K/uL Final    **Note: Absolute NRBC parameter is now reported with Hemogram**    Seg Neutrophils 08/11/2022 67  43 - 78 % Final    Lymphocytes 08/11/2022 24  13 - 44 % Final    Monocytes 08/11/2022 6  4.0 - 12.0 % Final    Eosinophils % 08/11/2022 1  0.5 - 7.8 % Final    Basophils 08/11/2022 1  0.0 - 2.0 % Final    Immature Granulocytes 08/11/2022 1  0.0 - 5.0 % Final    Segs Absolute 08/11/2022 5.3  1.7 - 8.2 K/UL Final    Absolute Lymph # 08/11/2022 1.9  0.5 - 4.6 K/UL Final    Absolute Mono # 08/11/2022 0.4  0.1 - 1.3 K/UL Final    Absolute Eos # 08/11/2022 0.1  0.0 - 0.8 K/UL Final    Basophils Absolute 08/11/2022 0.1  0.0 - 0.2 K/UL Final    Absolute Immature Granulocyte 08/11/2022 0.0  0.0 - 0.5 K/UL Final    Differential Type 08/11/2022 AUTOMATED    Final         Treatment Summary has been discussed and given to patient: n/a        -------------------------------------------------------------------------------------------------------------------  Please call our office at [phone] if you have any  of the following symptoms:   Fever of 100.5 or greater  Chills  Shortness of breath  Swelling or pain in one leg    After office hours an answering service is available and will contact a provider for emergencies or if you are experiencing any of the above symptoms. Patient has not express an interest in My Chart. My Chart log in information explained on the after visit summary printout at the [facility] T[facility] Patel [address] desk.     Jovanny Anderson RN

## 2022-08-11 NOTE — PROGRESS NOTES
HEMATOLOGY/ONCOLOGY FOLLOWUP  VISIT      Patient Name: Jaylin Calixto             Date of Visit: 2022  : 1950  Age:71 y.o. Presenting Complaint:  Jaylin Calixto  is seen in follow-up for iron deficiency and thrombocytosis. History of Present Illness:  Ms. Beth Curtis was seen for the first time in our office in 2019. She was a woman of excellent health with the exception of some chronic back discomfort. In fact, the patient  had only recently undergone a surgery on her lumbar region 2 weeks prior to her initial visit. She was referred from her primary care physician's office because of an abnormal platelet count. Laboratory data from February through October of this year  show platelet counts of 052, 577, and 572. This was associated with otherwise normal values for white count, hemoglobin and differential.  The MCV was in the 80s reviewing her data, thrombocytosis appears to date back as far as  at somewhat similar  levels. The only other laboratory data available included a normal basic metabolic profile and an iron level of 70, TIBC of 379 and ferritin level of 26. She was otherwise well. She had no fever, night sweats or other constitutional symptoms. She  had no unexpected weight loss. There had been no problems with bleeding bruising or thrombotic disease. She had no headache and no focal neurologic complaints. In 2017 she underwent an EGD showing hiatal hernia with gastric erythema and in  2018 she also underwent an EGD demonstrating a Schatzki's ring, hiatal hernia and Roberto lesions. An esophageal web was noted and she underwent dilatation. She was subsequently treated with parenteral iron for presumption of iron deficiency  and secondary thrombocytosis. The presence of an underlying myeloproliferative disorder was ruled out by appropriate FISH studies showing no evidence of alterations in CALR, MPL, and JOVANNA-2.   She was  seen by GI and evaluated for celiac disease. Biopsies were not consistent with that disorder. She returns today for follow-up. It has been 6 months since I saw her last.  Her only complaint today is ongoing back pain with some element of sciatica. She is about to undergo injections for her discomfort. She has no fever, night sweats or other constitutional symptoms. She has no headache and no focal neurologic complaints. She notes no bright red blood per rectum or melena. She has had no bruising. Medications:   Current Outpatient Medications   Medication Sig Dispense Refill    tiZANidine (ZANAFLEX) 4 MG capsule TAKE 1 CAPSULE BY MOUTH THREE TIMES DAILY AS NEEDED (LOW BACK PAIN)      traMADol (ULTRAM) 50 MG tablet Take 50 mg by mouth every 6 hours as needed. vitamin D (CHOLECALCIFEROL) 25 MCG (1000 UT) TABS tablet Take by mouth daily (Patient not taking: Reported on 8/11/2022)      spironolactone (ALDACTONE) 25 MG tablet Take 25 mg by mouth daily (Patient not taking: Reported on 8/11/2022)       No current facility-administered medications for this visit. Allergies:  No Known Allergies    Review of Systems: The Review of Systems is documented in full in the internal medical record. All systems are negative other than for those noted above. Past Medical History:  Documented in electronic medical record    Past Surgical History:  Documented in electronic medical record    Social History:  Documented in electronic medical record    Family History:  Documented in electronic medical record      Physical Examination:  General Appearance: Healthy appearing patient in no acute distress.    Vital signs: BP (!) 188/92 (Site: Right Upper Arm, Position: Standing)   Pulse 74   Temp 98.3 °F (36.8 °C)   Resp 14   Wt 232 lb (105.2 kg)   SpO2 97%   BMI 37.45 kg/m²     Performance status: ECOG Level: 0  Distress  Screening Score: PHQ-9 Total Score: 0 (8/11/2022  9:50 AM)    Pain score: Pain Score: 0 - No pain (Fatigue- 10)  HEENT: No oral exam performed. Neck: Supple. There is no thyromegaly. Lymph nodes: There is no cervical, supraclavicular, axillary or inguinal adenopathy. Breasts: Not examined. Lungs: The lungs are clear to auscultation and percussion. There is no egophony. There is no chest wall tenderness and no  use of accessory respiratory musculature. Heart: There is no jugular venous distention. The rate is normal and rhythm regular. The S1 and S2 are normal and there are no murmurs or rubs. Abdomen: Soft, non-tender, bowel sounds present and normal, no appreciated hepatosplenomegaly. No palpable masses. Skin: No rash, petechiae or ecchymoses. No evidence of malignancy. Extremities: No cyanosis, clubbing or edema. Labs/Imaging:  Lab Results   Component Value Date/Time    WBC 7.8 08/11/2022 09:28 AM    HGB 13.1 08/11/2022 09:28 AM    HCT 41.9 08/11/2022 09:28 AM     08/11/2022 09:28 AM    MCV 87.3 08/11/2022 09:28 AM       Lab Results   Component Value Date/Time     01/04/2022 10:54 AM    K 3.8 01/04/2022 10:54 AM     01/04/2022 10:54 AM    CO2 24 01/04/2022 10:54 AM    BUN 13 01/04/2022 10:54 AM    GFRAA >60 01/04/2022 10:54 AM    GLOB 4.3 01/04/2022 10:54 AM    ALT 28 01/04/2022 10:54 AM       Above results reviewed with patient. ASSESSMENT:  [facility] history of mild thrombocytosis which was associated with iron deficiency. Initially, treatment of her iron deficiency with parenteral iron improved her anemia but did not necessarily dramatically improve her thrombocytosis. Over time however her thrombocytosis has also resolved. Her CBC from today continues to look quite good and her platelet count is in the high normal range. Because of lab machinery malfunction, her ferritin level was not yet available at the time of this dictation.     PLAN:  Based on the data thus far from today, I do not feel she needs ongoing follow-up here in our [place]. We would be happy to see her again in the future should the need arise. At this point however we have no formal scheduled follow-up.         RESUSCITATION DIRECTIVES/HOSPICE CARE;  Full Support           [facility]    Oncology and Hematology   [address]  P [phone]  F [phone]  [email or web address]

## 2022-11-08 ENCOUNTER — HOSPITAL ENCOUNTER (OUTPATIENT)
Dept: MAMMOGRAPHY | Age: 72
Discharge: HOME | End: 2022-11-11
Payer: MEDICARE

## 2022-11-08 ENCOUNTER — CLINICAL DOCUMENTATION (OUTPATIENT)
Dept: ONCOLOGY | Age: 72
End: 2022-11-08

## 2022-11-08 DIAGNOSIS — Z12.31 ENCOUNTER FOR SCREENING MAMMOGRAM FOR MALIGNANT NEOPLASM OF BREAST: ICD-10-CM

## 2022-11-08 PROCEDURE — 77067 SCR MAMMO BI INCL CAD: CPT

## 2022-11-08 NOTE — PROGRESS NOTES
Cata Roger  [id number]        11/8/2022      I was called by Dr. Marta Anthony today to inform me that the patient had suffered a central retinal vein occlusion. I indicated that we would have Kellie Hatfield back in our office for further evaluation of this new finding.       Larisa Metcalf MD

## 2022-12-06 DIAGNOSIS — D50.9 IRON DEFICIENCY ANEMIA, UNSPECIFIED IRON DEFICIENCY ANEMIA TYPE: Primary | ICD-10-CM

## 2022-12-06 DIAGNOSIS — Z03.89 ENCOUNTER FOR OBSERVATION FOR OTHER SUSPECTED DISEASES AND CONDITIONS RULED OUT: ICD-10-CM

## 2022-12-08 ENCOUNTER — OFFICE VISIT (OUTPATIENT)
Dept: ONCOLOGY | Age: 72
End: 2022-12-08
Payer: MEDICARE

## 2022-12-08 ENCOUNTER — HOSPITAL ENCOUNTER (OUTPATIENT)
Dept: LAB | Age: 72
Discharge: HOME | End: 2022-12-08
Payer: MEDICARE

## 2022-12-08 VITALS
SYSTOLIC BLOOD PRESSURE: 131 MMHG | OXYGEN SATURATION: 97 % | DIASTOLIC BLOOD PRESSURE: 66 MMHG | RESPIRATION RATE: 16 BRPM | TEMPERATURE: 98.5 F | HEART RATE: 80 BPM | BODY MASS INDEX: 38.49 KG/M2 | WEIGHT: 239.5 LBS | HEIGHT: 66 IN

## 2022-12-08 DIAGNOSIS — D50.9 IRON DEFICIENCY ANEMIA, UNSPECIFIED IRON DEFICIENCY ANEMIA TYPE: ICD-10-CM

## 2022-12-08 DIAGNOSIS — D50.9 IRON DEFICIENCY ANEMIA, UNSPECIFIED IRON DEFICIENCY ANEMIA TYPE: Primary | ICD-10-CM

## 2022-12-08 DIAGNOSIS — Z03.89 ENCOUNTER FOR OBSERVATION FOR OTHER SUSPECTED DISEASES AND CONDITIONS RULED OUT: ICD-10-CM

## 2022-12-08 DIAGNOSIS — H34.12 CENTRAL RETINAL ARTERY OCCLUSION OF LEFT EYE: ICD-10-CM

## 2022-12-08 LAB
ALBUMIN SERPL-MCNC: 3.5 G/DL (ref 3.2–4.6)
ALBUMIN/GLOB SERPL: 0.9 {RATIO} (ref 0.4–1.6)
ALP SERPL-CCNC: 82 U/L (ref 50–136)
ALT SERPL-CCNC: 29 U/L (ref 12–65)
ANION GAP SERPL CALC-SCNC: 4 MMOL/L (ref 2–11)
AST SERPL-CCNC: 18 U/L (ref 15–37)
BASOPHILS # BLD: 0.1 K/UL (ref 0–0.2)
BASOPHILS NFR BLD: 1 % (ref 0–2)
BILIRUB SERPL-MCNC: 0.4 MG/DL (ref 0.2–1.1)
BUN SERPL-MCNC: 23 MG/DL (ref 8–23)
CALCIUM SERPL-MCNC: 9.9 MG/DL (ref 8.3–10.4)
CHLORIDE SERPL-SCNC: 106 MMOL/L (ref 101–110)
CO2 SERPL-SCNC: 29 MMOL/L (ref 21–32)
CREAT SERPL-MCNC: 1.1 MG/DL (ref 0.6–1)
DIFFERENTIAL METHOD BLD: ABNORMAL
EOSINOPHIL # BLD: 0.2 K/UL (ref 0–0.8)
EOSINOPHIL NFR BLD: 2 % (ref 0.5–7.8)
ERYTHROCYTE [DISTWIDTH] IN BLOOD BY AUTOMATED COUNT: 15.4 % (ref 11.9–14.6)
FERRITIN SERPL-MCNC: 81 NG/ML (ref 8–388)
GLOBULIN SER CALC-MCNC: 4.1 G/DL (ref 2.8–4.5)
GLUCOSE SERPL-MCNC: 112 MG/DL (ref 65–100)
HCT VFR BLD AUTO: 41.7 % (ref 35.8–46.3)
HGB BLD-MCNC: 12.8 G/DL (ref 11.7–15.4)
IMM GRANULOCYTES # BLD AUTO: 0 K/UL (ref 0–0.5)
IMM GRANULOCYTES NFR BLD AUTO: 0 % (ref 0–5)
IRON SATN MFR SERPL: 15 %
IRON SERPL-MCNC: 49 UG/DL (ref 35–150)
LYMPHOCYTES # BLD: 2.5 K/UL (ref 0.5–4.6)
LYMPHOCYTES NFR BLD: 27 % (ref 13–44)
MCH RBC QN AUTO: 26.7 PG (ref 26.1–32.9)
MCHC RBC AUTO-ENTMCNC: 30.7 G/DL (ref 31.4–35)
MCV RBC AUTO: 87.1 FL (ref 82–102)
MONOCYTES # BLD: 0.6 K/UL (ref 0.1–1.3)
MONOCYTES NFR BLD: 6 % (ref 4–12)
NEUTS SEG # BLD: 6 K/UL (ref 1.7–8.2)
NEUTS SEG NFR BLD: 64 % (ref 43–78)
NRBC # BLD: 0 K/UL (ref 0–0.2)
PLATELET # BLD AUTO: 549 K/UL (ref 150–450)
PMV BLD AUTO: 9.7 FL (ref 9.4–12.3)
POTASSIUM SERPL-SCNC: 3.6 MMOL/L (ref 3.5–5.1)
PROT SERPL-MCNC: 7.6 G/DL (ref 6.3–8.2)
RBC # BLD AUTO: 4.79 M/UL (ref 4.05–5.2)
SODIUM SERPL-SCNC: 139 MMOL/L (ref 133–143)
TIBC SERPL-MCNC: 327 UG/DL (ref 250–450)
WBC # BLD AUTO: 9.4 K/UL (ref 4.3–11.1)

## 2022-12-08 PROCEDURE — 83550 IRON BINDING TEST: CPT

## 2022-12-08 PROCEDURE — G8417 CALC BMI ABV UP PARAM F/U: HCPCS | Performed by: INTERNAL MEDICINE

## 2022-12-08 PROCEDURE — G8484 FLU IMMUNIZE NO ADMIN: HCPCS | Performed by: INTERNAL MEDICINE

## 2022-12-08 PROCEDURE — 85240 CLOT FACTOR VIII AHG 1 STAGE: CPT

## 2022-12-08 PROCEDURE — 82728 ASSAY OF FERRITIN: CPT

## 2022-12-08 PROCEDURE — 99215 OFFICE O/P EST HI 40 MIN: CPT | Performed by: INTERNAL MEDICINE

## 2022-12-08 PROCEDURE — 80053 COMPREHEN METABOLIC PANEL: CPT

## 2022-12-08 PROCEDURE — 1036F TOBACCO NON-USER: CPT | Performed by: INTERNAL MEDICINE

## 2022-12-08 PROCEDURE — 36415 COLL VENOUS BLD VENIPUNCTURE: CPT

## 2022-12-08 PROCEDURE — G8427 DOCREV CUR MEDS BY ELIG CLIN: HCPCS | Performed by: INTERNAL MEDICINE

## 2022-12-08 PROCEDURE — G8399 PT W/DXA RESULTS DOCUMENT: HCPCS | Performed by: INTERNAL MEDICINE

## 2022-12-08 PROCEDURE — 1123F ACP DISCUSS/DSCN MKR DOCD: CPT | Performed by: INTERNAL MEDICINE

## 2022-12-08 PROCEDURE — 85025 COMPLETE CBC W/AUTO DIFF WBC: CPT

## 2022-12-08 PROCEDURE — 1090F PRES/ABSN URINE INCON ASSESS: CPT | Performed by: INTERNAL MEDICINE

## 2022-12-08 PROCEDURE — 3078F DIAST BP <80 MM HG: CPT | Performed by: INTERNAL MEDICINE

## 2022-12-08 PROCEDURE — 3075F SYST BP GE 130 - 139MM HG: CPT | Performed by: INTERNAL MEDICINE

## 2022-12-08 PROCEDURE — 3017F COLORECTAL CA SCREEN DOC REV: CPT | Performed by: INTERNAL MEDICINE

## 2022-12-08 RX ORDER — AMLODIPINE, VALSARTAN AND HYDROCHLOROTHIAZIDE 10; 320; 25 MG/1; MG/1; MG/1
TABLET ORAL
COMMUNITY
Start: 2022-12-06

## 2022-12-08 RX ORDER — ATORVASTATIN CALCIUM 40 MG/1
TABLET, FILM COATED ORAL
COMMUNITY
Start: 2022-11-08

## 2022-12-08 RX ORDER — OMEPRAZOLE 40 MG/1
CAPSULE, DELAYED RELEASE ORAL
COMMUNITY
Start: 2022-11-13

## 2022-12-08 ASSESSMENT — PATIENT HEALTH QUESTIONNAIRE - PHQ9
SUM OF ALL RESPONSES TO PHQ QUESTIONS 1-9: 0
SUM OF ALL RESPONSES TO PHQ QUESTIONS 1-9: 0
1. LITTLE INTEREST OR PLEASURE IN DOING THINGS: 0
SUM OF ALL RESPONSES TO PHQ QUESTIONS 1-9: 0
SUM OF ALL RESPONSES TO PHQ9 QUESTIONS 1 & 2: 0
2. FEELING DOWN, DEPRESSED OR HOPELESS: 0
SUM OF ALL RESPONSES TO PHQ QUESTIONS 1-9: 0

## 2022-12-08 NOTE — PATIENT INSTRUCTIONS
Patient Instructions from Today's Visit    Reason for Visit:  Follow up-ANDERSON    Diagnosis Information:  [email or web address]      Plan:  Dr Kiara Kidd would more than likely do an echocardiogram as well as a carotid ultrasound  We will also get some labs today.   Follow Up:  Dr Salcido Hauula in 2 weeks    Recent Lab Results:  Hospital Outpatient Visit on 12/08/2022   Component Date Value Ref Range Status    WBC 12/08/2022 9.4  4.3 - 11.1 K/uL Final    RBC 12/08/2022 4.79  4.05 - 5.2 M/uL Final    Hemoglobin 12/08/2022 12.8  11.7 - 15.4 g/dL Final    Hematocrit 12/08/2022 41.7  35.8 - 46.3 % Final    MCV 12/08/2022 87.1  82.0 - 102.0 FL Final    MCH 12/08/2022 26.7  26.1 - 32.9 PG Final    MCHC 12/08/2022 30.7 (A)  31.4 - 35.0 g/dL Final    RDW 12/08/2022 15.4 (A)  11.9 - 14.6 % Final    Platelets 15/51/8480 549 (A)  150 - 450 K/uL Final    MPV 12/08/2022 9.7  9.4 - 12.3 FL Final    nRBC 12/08/2022 0.00  0.0 - 0.2 K/uL Final    **Note: Absolute NRBC parameter is now reported with Hemogram**    Seg Neutrophils 12/08/2022 64  43 - 78 % Final    Lymphocytes 12/08/2022 27  13 - 44 % Final    Monocytes 12/08/2022 6  4.0 - 12.0 % Final    Eosinophils % 12/08/2022 2  0.5 - 7.8 % Final    Basophils 12/08/2022 1  0.0 - 2.0 % Final    Immature Granulocytes 12/08/2022 0  0.0 - 5.0 % Final    Segs Absolute 12/08/2022 6.0  1.7 - 8.2 K/UL Final    Absolute Lymph # 12/08/2022 2.5  0.5 - 4.6 K/UL Final    Absolute Mono # 12/08/2022 0.6  0.1 - 1.3 K/UL Final    Absolute Eos # 12/08/2022 0.2  0.0 - 0.8 K/UL Final    Basophils Absolute 12/08/2022 0.1  0.0 - 0.2 K/UL Final    Absolute Immature Granulocyte 12/08/2022 0.0  0.0 - 0.5 K/UL Final    Differential Type 12/08/2022 AUTOMATED    Final         Treatment Summary has been discussed and given to patient: n/a        -------------------------------------------------------------------------------------------------------------------  Please call our office at [phone] if you have any  of the following symptoms:   Fever of 100.5 or greater  Chills  Shortness of breath  Swelling or pain in one leg    After office hours an answering service is available and will contact a provider for emergencies or if you are experiencing any of the above symptoms. Patient does express an interest in My Chart. My Chart log in information explained on the after visit summary printout at the [facility]el [address] desk.     [place], MA

## 2022-12-09 NOTE — PROGRESS NOTES
HEMATOLOGY/ONCOLOGY FOLLOWUP  VISIT      Patient Name: Hemanth Henson             Date of Visit: 2022  : 1950  Age:72 y.o. Presenting Complaint:  Hemanth Henson  is seen in follow-up for iron deficiency and thrombocytosis. History of Present Illness:  Ms. Sudarshan Cook was seen for the first time in our office in 2019. She was a woman of excellent health with the exception of some chronic back discomfort. In fact, the patient  had only recently undergone a surgery on her lumbar region 2 weeks prior to her initial visit. She was referred from her primary care physician's office because of an abnormal platelet count. Laboratory data from February through October of this year  show platelet counts of 358, 577, and 572. This was associated with otherwise normal values for white count, hemoglobin and differential.  The MCV was in the 80s reviewing her data, thrombocytosis appears to date back as far as  at somewhat similar  levels. The only other laboratory data available included a normal basic metabolic profile and an iron level of 70, TIBC of 379 and ferritin level of 26. She was otherwise well. She had no fever, night sweats or other constitutional symptoms. She  had no unexpected weight loss. There had been no problems with bleeding bruising or thrombotic disease. She had no headache and no focal neurologic complaints. In 2017 she underwent an EGD showing hiatal hernia with gastric erythema and in  2018 she also underwent an EGD demonstrating a Schatzki's ring, hiatal hernia and Roberto lesions. An esophageal web was noted and she underwent dilatation. She was subsequently treated with parenteral iron for presumption of iron deficiency  and secondary thrombocytosis. The presence of an underlying myeloproliferative disorder was ruled out by appropriate FISH studies showing no evidence of alterations in CALR, MPL, and JOVANNA-2.   She was  seen by GI and evaluated for celiac disease. Biopsies were not consistent with that disorder. She returns today for follow-up. Her visit today was prompted by her primary care provider who asked that she be seen for a new problem. Approximately 6 weeks ago the patient awakened from sleep with loss of vision in her left eye. I do not have the data associated with her subsequent work-up but apparently she was found to have a central retinal vein occlusion. Her vision at the present time is slightly better than it was previously but it is still quite limited with the exception of some peripheral recognition. She apparently has undergone a series of ophthalmologic evaluations but no detailed medical evaluations per. Specifically, she has not had a carotid ultrasound nor an echocardiogram.  She is however scheduled to be seen cardiology in the near future. She has no known history of thrombosis. As noted above, she has had negative studies for ALLYSON reticulin, MPL and Miko 2. She had otherwise been doing well. There is no family history of clotting. She has no new systemic illness. Her appetite is good and her weight has been stable. She has no other constitutional symptoms. Medications:   Current Outpatient Medications   Medication Sig Dispense Refill    omeprazole (PRILOSEC) 40 MG delayed release capsule TAKE 1 CAPSULE BY MOUTH EVERY DAY      atorvastatin (LIPITOR) 40 MG tablet TAKE 1 TABLET BY MOUTH EVERY DAY      amLODIPine-valsartan-HCTZ -25 MG TABS       tiZANidine (ZANAFLEX) 4 MG capsule TAKE 1 CAPSULE BY MOUTH THREE TIMES DAILY AS NEEDED (LOW BACK PAIN)      traMADol (ULTRAM) 50 MG tablet Take 50 mg by mouth every 6 hours as needed.       vitamin D (CHOLECALCIFEROL) 25 MCG (1000 UT) TABS tablet Take by mouth daily (Patient not taking: No sig reported)      spironolactone (ALDACTONE) 25 MG tablet Take 25 mg by mouth daily (Patient not taking: No sig reported)       No current facility-administered medications for this visit. Allergies:  No Known Allergies    Review of Systems: The Review of Systems is documented in full in the internal medical record. All systems are negative other than for those noted above. Past Medical History:  Documented in electronic medical record    Past Surgical History:  Documented in electronic medical record    Social History:  Documented in electronic medical record    Family History:  Documented in electronic medical record      Physical Examination:  General Appearance: Healthy appearing patient in no acute distress. Vital signs: /66   Pulse 80   Temp 98.5 °F (36.9 °C) (Oral)   Resp 16   Ht 5' 6\" (1.676 m)   Wt 239 lb 8 oz (108.6 kg)   SpO2 97%   BMI 38.66 kg/m²     Performance status: ECOG Level: 0  Distress  Screening Score: PHQ-9 Total Score: 0 (12/8/2022  3:16 PM)    Pain score: Pain Score:   3 (fatigue-0)  HEENT: No oral exam performed. Neck: Supple. There is no thyromegaly. Lymph nodes: There is no cervical, supraclavicular, axillary or inguinal adenopathy. Breasts: Not examined. Lungs: The lungs are clear to auscultation and percussion. There is no egophony. There is no chest wall tenderness and no  use of accessory respiratory musculature. Heart: There is no jugular venous distention. The rate is normal and rhythm regular. The S1 and S2 are normal and there are no murmurs or rubs. Abdomen: Soft, non-tender, bowel sounds present and normal, no appreciated hepatosplenomegaly. No palpable masses. Skin: No rash, petechiae or ecchymoses. No evidence of malignancy. Extremities: No cyanosis, clubbing or edema.        Labs/Imaging:  Lab Results   Component Value Date/Time    WBC 9.4 12/08/2022 02:55 PM    HGB 12.8 12/08/2022 02:55 PM    HCT 41.7 12/08/2022 02:55 PM     12/08/2022 02:55 PM    MCV 87.1 12/08/2022 02:55 PM       Lab Results   Component Value Date/Time     12/08/2022 02:55 PM    K 3.6 12/08/2022 02:55 PM     12/08/2022 02:55 PM    CO2 29 12/08/2022 02:55 PM    BUN 23 12/08/2022 02:55 PM    GFRAA >60 08/11/2022 09:28 AM    GLOB 4.1 12/08/2022 02:55 PM    ALT 29 12/08/2022 02:55 PM       Above results reviewed with patient. ASSESSMENT:  Ms. Saint Clair has a history of mild thrombocytosis which was associated with iron deficiency. Initially, treatment of her iron deficiency with parenteral iron improved her anemia but did not necessarily dramatically improve her thrombocytosis. Although the thrombocytosis gradually resolved, it is now evident again today. Work-up has not disclosed an underlying myeloproliferative disorder that would make her hypercoagulable however. She now presents with a central retinal vein occlusion. PLAN:  We will proceed with a work-up for hypercoagulability to include activities of protein C protein S and Antithrombin as well as analysis for lupus anticoagulant, antiphospholipid antibodies, factor V Leiden, and prothrombin gene mutations. She might benefit from antiplatelet therapy and is scheduled to undergo a Cardiologic evaluation in the near future which will presumably amongst other things include an echocardiogram and cerebrovascular studies. She and I will speak after the results of the hypercoagulability testing is done. At that point I will likely speak with her about the possibility of starting some aspirin.         RESUSCITATION DIRECTIVES/HOSPICE CARE;  Full Support           [facility]    Oncology and Hematology   [place]  [address] [place] [address]  P [phone]  F [phone]  [email or web address]

## 2022-12-15 LAB
APCR PPP: 2.6 RATIO
APTT HEX PL PPP: 0 SEC
APTT IMM NP PPP: ABNORMAL SEC
APTT PPP 1:1 SALINE: ABNORMAL SEC
APTT PPP: 26 SEC
AT III ACT/NOR PPP CHRO: 126 %
B2 GLYCOPROT1 IGA SER-ACNC: <10 SAU
B2 GLYCOPROT1 IGG SER-ACNC: <10 SGU
B2 GLYCOPROT1 IGM SER-ACNC: <10 SMU
CARDIOLIPIN IGG SER IA-ACNC: <10 GPL
CARDIOLIPIN IGM SER IA-ACNC: 12 MPL
CONFIRM DRVVT: ABNORMAL SEC
F2 GENE MUT ANL BLD/T: ABNORMAL
FACTOR VIII ACTIVITY: 215 %
GENE DIS ANL INTERP-IMP: ABNORMAL
HOMOCYSTEINE: 9.7 UMOL/L
LABORATORY COMMENT REPORT: ABNORMAL
LABORATORY COMMENT REPORT: ABNORMAL
PROT C ACT/NOR PPP CHRO: 170 %
PROTEIN S ANTIGEN, FREE: 119 %
REF LAB TEST METHOD: ABNORMAL
SCREEN DRVVT/NORMAL: ABNORMAL RATIO
SCREEN DRVVT: 41.5 SEC

## 2022-12-22 ENCOUNTER — OFFICE VISIT (OUTPATIENT)
Dept: ONCOLOGY | Age: 72
End: 2022-12-22
Payer: MEDICARE

## 2022-12-22 VITALS
HEIGHT: 66 IN | SYSTOLIC BLOOD PRESSURE: 128 MMHG | DIASTOLIC BLOOD PRESSURE: 68 MMHG | RESPIRATION RATE: 14 BRPM | OXYGEN SATURATION: 97 % | WEIGHT: 235.6 LBS | TEMPERATURE: 98.9 F | BODY MASS INDEX: 37.86 KG/M2 | HEART RATE: 92 BPM

## 2022-12-22 DIAGNOSIS — H34.8122 CENTRAL RETINAL VEIN OCCLUSION OF LEFT EYE, UNSPECIFIED COMPLICATION STATUS: Primary | ICD-10-CM

## 2022-12-22 PROCEDURE — G8427 DOCREV CUR MEDS BY ELIG CLIN: HCPCS | Performed by: INTERNAL MEDICINE

## 2022-12-22 PROCEDURE — G8417 CALC BMI ABV UP PARAM F/U: HCPCS | Performed by: INTERNAL MEDICINE

## 2022-12-22 PROCEDURE — 1090F PRES/ABSN URINE INCON ASSESS: CPT | Performed by: INTERNAL MEDICINE

## 2022-12-22 PROCEDURE — 3017F COLORECTAL CA SCREEN DOC REV: CPT | Performed by: INTERNAL MEDICINE

## 2022-12-22 PROCEDURE — 1036F TOBACCO NON-USER: CPT | Performed by: INTERNAL MEDICINE

## 2022-12-22 PROCEDURE — 1123F ACP DISCUSS/DSCN MKR DOCD: CPT | Performed by: INTERNAL MEDICINE

## 2022-12-22 PROCEDURE — 3078F DIAST BP <80 MM HG: CPT | Performed by: INTERNAL MEDICINE

## 2022-12-22 PROCEDURE — G8484 FLU IMMUNIZE NO ADMIN: HCPCS | Performed by: INTERNAL MEDICINE

## 2022-12-22 PROCEDURE — 3074F SYST BP LT 130 MM HG: CPT | Performed by: INTERNAL MEDICINE

## 2022-12-22 PROCEDURE — 99213 OFFICE O/P EST LOW 20 MIN: CPT | Performed by: INTERNAL MEDICINE

## 2022-12-22 PROCEDURE — G8399 PT W/DXA RESULTS DOCUMENT: HCPCS | Performed by: INTERNAL MEDICINE

## 2022-12-22 ASSESSMENT — PATIENT HEALTH QUESTIONNAIRE - PHQ9
SUM OF ALL RESPONSES TO PHQ QUESTIONS 1-9: 1
SUM OF ALL RESPONSES TO PHQ QUESTIONS 1-9: 1
2. FEELING DOWN, DEPRESSED OR HOPELESS: 1
1. LITTLE INTEREST OR PLEASURE IN DOING THINGS: 0
SUM OF ALL RESPONSES TO PHQ9 QUESTIONS 1 & 2: 1
SUM OF ALL RESPONSES TO PHQ QUESTIONS 1-9: 1
SUM OF ALL RESPONSES TO PHQ QUESTIONS 1-9: 1

## 2022-12-22 NOTE — PATIENT INSTRUCTIONS
Patient Instructions from Today's Visit    Reason for Visit:  Follow up Anemia    Plan:  Discussed lab results  Take a baby aspirin 81 mg daily   No heavy anticoagulants needed    Follow Up: Follow up as needed    Recent Lab Results:  N/A    Treatment Summary has been discussed and given to patient: n/a        -------------------------------------------------------------------------------------------------------------------  Please call our office at [phone] if you have any  of the following symptoms:   Fever of 100.5 or greater  Chills  Shortness of breath  Swelling or pain in one leg    After office hours an answering service is available and will contact a provider for emergencies or if you are experiencing any of the above symptoms. Patient does express an interest in My Chart. My Chart log in information explained on the after visit summary printout at the [facility] desk.     Lobo Rush RN

## 2022-12-23 NOTE — PROGRESS NOTES
HEMATOLOGY/ONCOLOGY FOLLOWUP  VISIT      Patient Name: Hemanth Henson             Date of Visit: 2022  : 1950  Age:72 y.o. Presenting Complaint:  Hemanth Henson  is seen in follow-up for iron deficiency and thrombocytosis. History of Present Illness:  Ms. Saint Clair was seen for the first time in our office in 2019. She was a woman of excellent health with the exception of some chronic back discomfort. In fact, the patient  had only recently undergone a surgery on her lumbar region 2 weeks prior to her initial visit. She was referred from her primary care physician's office because of an abnormal platelet count. Laboratory data from February through October of this year  show platelet counts of 185, 577, and 572. This was associated with otherwise normal values for white count, hemoglobin and differential.  The MCV was in the 80s reviewing her data, thrombocytosis appears to date back as far as  at somewhat similar  levels. The only other laboratory data available included a normal basic metabolic profile and an iron level of 70, TIBC of 379 and ferritin level of 26. She was otherwise well. She had no fever, night sweats or other constitutional symptoms. She  had no unexpected weight loss. There had been no problems with bleeding bruising or thrombotic disease. She had no headache and no focal neurologic complaints. In 2017 she underwent an EGD showing hiatal hernia with gastric erythema and in  2018 she also underwent an EGD demonstrating a Schatzki's ring, hiatal hernia and Roberto lesions. An esophageal web was noted and she underwent dilatation. She was subsequently treated with parenteral iron for presumption of iron deficiency  and secondary thrombocytosis. The presence of an underlying myeloproliferative disorder was ruled out by appropriate FISH studies showing no evidence of alterations in CALR, MPL, and JOVANNA-2. She was  seen by GI and evaluated for celiac disease. Biopsies were not consistent with that disorder. In November 2022, she awakened from sleep with loss of vision in her left eye. I do not have the data associated with her subsequent work-up but apparently she was found to have a central retinal vein occlusion. She apparently underwent a series of ophthalmologic evaluations but no detailed medical evaluations. She has no known history of thrombosis. She has had negative studies for ALLYSON reticulin, MPL and Miko 2. She returns today for follow-up and review of data. She has had a venous thrombosis profile sent which was negative for Activated Protein C resistance, prothrombin gene mutation, and lupus anticoagulant. She had normal activities of protein C, S and antithrombin. There were no anticardiolipin antibodies. Her vision is stable. Current Outpatient Medications   Medication Sig Dispense Refill    omeprazole (PRILOSEC) 40 MG delayed release capsule TAKE 1 CAPSULE BY MOUTH EVERY DAY      atorvastatin (LIPITOR) 40 MG tablet TAKE 1 TABLET BY MOUTH EVERY DAY      amLODIPine-valsartan-HCTZ -25 MG TABS       tiZANidine (ZANAFLEX) 4 MG capsule TAKE 1 CAPSULE BY MOUTH THREE TIMES DAILY AS NEEDED (LOW BACK PAIN)      traMADol (ULTRAM) 50 MG tablet Take 50 mg by mouth every 6 hours as needed. vitamin D (CHOLECALCIFEROL) 25 MCG (1000 UT) TABS tablet Take by mouth daily (Patient not taking: No sig reported)      spironolactone (ALDACTONE) 25 MG tablet Take 25 mg by mouth daily (Patient not taking: No sig reported)       No current facility-administered medications for this visit. Allergies:  No Known Allergies    Review of Systems: The Review of Systems is documented in full in the internal medical record. All systems are negative other than for those noted above.      Past Medical History:  Documented in electronic medical record    Past Surgical History:  Documented in electronic medical record    Social History:  Documented in electronic medical record    Family History:  Documented in electronic medical record      Physical Examination:  General Appearance: Healthy appearing patient in no acute distress. Vital signs: /68   Pulse 92   Temp 98.9 °F (37.2 °C) (Oral)   Resp 14   Ht 5' 6\" (1.676 m)   Wt 235 lb 9.6 oz (106.9 kg)   SpO2 97%   BMI 38.03 kg/m²     Performance status: ECOG Level: 0  Distress  Screening Score: PHQ-9 Total Score: 1 (12/22/2022  3:48 PM)    Pain score: Pain Score:   0 - No pain (fatigue-3)  HEENT: No oral exam performed. Neck: Supple. There is no thyromegaly. Lymph nodes: There is no cervical, supraclavicular, axillary or inguinal adenopathy. Breasts: Not examined. Lungs: The lungs are clear to auscultation and percussion. There is no egophony. There is no chest wall tenderness and no  use of accessory respiratory musculature. Heart: There is no jugular venous distention. The rate is normal and rhythm regular. The S1 and S2 are normal and there are no murmurs or rubs. Abdomen: Soft, non-tender, bowel sounds present and normal, no appreciated hepatosplenomegaly. No palpable masses. Skin: No rash, petechiae or ecchymoses. No evidence of malignancy. Extremities: No cyanosis, clubbing or edema. Labs/Imaging:  Lab Results   Component Value Date/Time    WBC 9.4 12/08/2022 02:55 PM    HGB 12.8 12/08/2022 02:55 PM    HCT 41.7 12/08/2022 02:55 PM     12/08/2022 02:55 PM    MCV 87.1 12/08/2022 02:55 PM       Lab Results   Component Value Date/Time     12/08/2022 02:55 PM    K 3.6 12/08/2022 02:55 PM     12/08/2022 02:55 PM    CO2 29 12/08/2022 02:55 PM    BUN 23 12/08/2022 02:55 PM    GFRAA >60 08/11/2022 09:28 AM    GLOB 4.1 12/08/2022 02:55 PM    ALT 29 12/08/2022 02:55 PM       Above results reviewed with patient. ASSESSMENT:  Ms. [facility] has a history of mild thrombocytosis which was associated with iron deficiency. Initially, treatment of her iron deficiency with parenteral iron improved her anemia but did not necessarily dramatically improve her thrombocytosis. Although the thrombocytosis gradually resolved, it became evident again with this presentation. Her thrombosis profile is otherwise negative. PLAN:  She presents now with a central retinal vein occlusion and thrombocytosis without overt hypercoaguability. We will suggest she remain on a baby aspirin daily.          RESUSCITATION DIRECTIVES/HOSPICE CARE;  Full Support           [facility]    Oncology and Hematology   [address]  P [phone]  F [phone]  [email or web address]

## 2022-12-27 ENCOUNTER — OFFICE VISIT (OUTPATIENT)
Dept: CARDIOLOGY CLINIC | Age: 72
End: 2022-12-27
Payer: MEDICARE

## 2022-12-27 VITALS
WEIGHT: 236 LBS | HEIGHT: 66 IN | SYSTOLIC BLOOD PRESSURE: 122 MMHG | BODY MASS INDEX: 37.93 KG/M2 | DIASTOLIC BLOOD PRESSURE: 82 MMHG | HEART RATE: 82 BPM

## 2022-12-27 DIAGNOSIS — R00.2 PALPITATIONS: ICD-10-CM

## 2022-12-27 DIAGNOSIS — G45.9 TIA (TRANSIENT ISCHEMIC ATTACK): ICD-10-CM

## 2022-12-27 DIAGNOSIS — H34.8122 CENTRAL RETINAL VEIN OCCLUSION OF LEFT EYE, UNSPECIFIED COMPLICATION STATUS: Primary | ICD-10-CM

## 2022-12-27 DIAGNOSIS — I42.2 HYPERTROPHIC CARDIOMYOPATHY (HCC): ICD-10-CM

## 2022-12-27 DIAGNOSIS — I10 ESSENTIAL HYPERTENSION WITH GOAL BLOOD PRESSURE LESS THAN 140/90: ICD-10-CM

## 2022-12-27 PROCEDURE — G8428 CUR MEDS NOT DOCUMENT: HCPCS | Performed by: INTERNAL MEDICINE

## 2022-12-27 PROCEDURE — 99214 OFFICE O/P EST MOD 30 MIN: CPT | Performed by: INTERNAL MEDICINE

## 2022-12-27 PROCEDURE — 3074F SYST BP LT 130 MM HG: CPT | Performed by: INTERNAL MEDICINE

## 2022-12-27 PROCEDURE — 3017F COLORECTAL CA SCREEN DOC REV: CPT | Performed by: INTERNAL MEDICINE

## 2022-12-27 PROCEDURE — G8417 CALC BMI ABV UP PARAM F/U: HCPCS | Performed by: INTERNAL MEDICINE

## 2022-12-27 PROCEDURE — 1123F ACP DISCUSS/DSCN MKR DOCD: CPT | Performed by: INTERNAL MEDICINE

## 2022-12-27 PROCEDURE — G8484 FLU IMMUNIZE NO ADMIN: HCPCS | Performed by: INTERNAL MEDICINE

## 2022-12-27 PROCEDURE — 1036F TOBACCO NON-USER: CPT | Performed by: INTERNAL MEDICINE

## 2022-12-27 PROCEDURE — 1090F PRES/ABSN URINE INCON ASSESS: CPT | Performed by: INTERNAL MEDICINE

## 2022-12-27 PROCEDURE — G8399 PT W/DXA RESULTS DOCUMENT: HCPCS | Performed by: INTERNAL MEDICINE

## 2022-12-27 PROCEDURE — 3078F DIAST BP <80 MM HG: CPT | Performed by: INTERNAL MEDICINE

## 2022-12-27 PROCEDURE — 93000 ELECTROCARDIOGRAM COMPLETE: CPT | Performed by: INTERNAL MEDICINE

## 2022-12-27 RX ORDER — ASPIRIN 81 MG/1
81 TABLET ORAL DAILY
COMMUNITY

## 2022-12-27 NOTE — PROGRESS NOTES
[address], [address] [facility], [address] [place] [address]  PHONE: [phone]     22    NAME:  Aleja Andrews  : [birth year]  MRN: [id number]       SUBJECTIVE:   Aleja Andrews is a 67 y.o. female seen for a follow up visit regarding the following:     Chief Complaint   Patient presents with    Cardiomyopathy     L/s 2020 blood clot in left eye. HPI: Here after sudden vision loss. Eye vision loss felt to be TIA related. Getting flushed feeling at times now. Some palp. No CP, JOHNS, angina. Patient denies recent history of orthopnea, PND, excessive dizziness and/or syncope. No CP. Past Medical History, Past Surgical History, Family history, Social History, and Medications were all reviewed with the patient today and updated as necessary. Current Outpatient Medications   Medication Sig Dispense Refill    aspirin 81 MG EC tablet Take 81 mg by mouth daily      omeprazole (PRILOSEC) 40 MG delayed release capsule TAKE 1 CAPSULE BY MOUTH EVERY DAY      atorvastatin (LIPITOR) 40 MG tablet TAKE 1 TABLET BY MOUTH EVERY DAY      amLODIPine-valsartan-HCTZ -25 MG TABS       tiZANidine (ZANAFLEX) 4 MG capsule TAKE 1 CAPSULE BY MOUTH THREE TIMES DAILY AS NEEDED (LOW BACK PAIN)      traMADol (ULTRAM) 50 MG tablet Take 50 mg by mouth every 6 hours as needed. vitamin D (CHOLECALCIFEROL) 25 MCG (1000 UT) TABS tablet Take by mouth daily      spironolactone (ALDACTONE) 25 MG tablet Take 25 mg by mouth daily       No current facility-administered medications for this visit.         No Known Allergies  Patient Active Problem List    Diagnosis Date Noted    TIA (transient ischemic attack) 2022     Priority: Medium    Central retinal vein occlusion of left eye 2022     Priority: Medium    Family history of breast cancer 09/15/2020    Venous insufficiency 2020    Iron deficiency 2019    Severe obesity ([address] 2019    Hiatal hernia 2018 EGD - schatztk's ring, Hiatal Hernia, Roberto lesions, esophageal   web, s/p dilation        Roberto lesion, chronic 12/07/2018  EGD - schatztk's ring, Hiatal Hernia, Roberto lesions, esophageal   web, s/p dilation        Hypertrophic cardiomyopathy ([address] 11/21/2017    Depression 09/18/2017    Lumbar disc disease 08/18/2017    Colon polyps 07/26/2017     Tubular adenoma by path report 7-        Syncope 07/14/2017    Palpitations 07/14/2017    Vitamin D deficiency 05/16/2017    Chronic back pain     Obesity, Class I, BMI 30-34.9     Renal cyst, acquired, right     Essential hypertension with goal blood pressure less than 140/90 08/08/2016    Sciatica 08/14/2012    GERD (gastroesophageal reflux disease) 08/14/2012  EGD - schatztk's ring, Hiatal Hernia, Roberto lesions, esophageal   web, s/p dilation          Past Surgical History:   Procedure Laterality Date    APPENDECTOMY      CATARACT REMOVAL Bilateral     COLONOSCOPY  07/26/2017 7- - Colonoscopy - Colon polyps hepatic flexure, diverticulosis of colon - sent for scanning    LUMBAR LAMINECTOMY  12/2017    L4-5 - Dr. Myles James  11/2019    Dr. Kathleen Flores - redo surgery with lysis of scar as well    FRANCIS AND BSO (CERVIX REMOVED)      UPPER GASTROINTESTINAL ENDOSCOPY  12/05/2018  EGD - schatztk's ring, Hiatal Hernia, Roberto lesions, esophageal web, s/p dilation    UPPER GASTROINTESTINAL ENDOSCOPY  07/26/2017 7- - EGD- hiatal hernia, gastric erythema - very focal and nonspecific erythema- plan avoid NSAIDS, continue PPI. - sent for scanning     Family History   Problem Relation Age of Onset    Cancer Mother         breast    Diabetes Mother     Breast Cancer Mother 79    Heart Disease Father         Infection s/p CABG    Crohn's Disease Sister     Diabetes Sister     Obesity Sister     Osteoarthritis Sister         Needs Knee Replacement    Cancer Sister         breast    Breast Cancer Sister 36 Osteoarthritis Brother         Back and Knees    Psychiatric Disorder Brother      Social History     Tobacco Use    Smoking status: Former     Packs/day: 0.20     Types: Cigarettes     Quit date: 1981     Years since quittin.0    Smokeless tobacco: Never   Substance Use Topics    Alcohol use: No     Alcohol/week: 0.0 standard drinks         ROS:    No obvious pertinent positives on review of systems except for what was outlined in the HPI today. PHYSICAL EXAM:     /82   Pulse 82   Ht 5' 6\" (1.676 m)   Wt 236 lb (107 kg)   BMI 38.09 kg/m²    General/Constitutional:   Alert and oriented x 3, no acute distress  HEENT:   normocephalic, atraumatic, moist mucous membranes  Neck:   No JVD or carotid bruits bilaterally  Cardiovascular:   regular rate and rhythm, no murmur/rub/gallop appreciated  Pulmonary:   clear to auscultation bilaterally, no respiratory distress  Abdomen:   soft, non-tender, non-distended  Ext:   No sig LE edema bilaterally  Skin:  warm and dry, no obvious rashes seen  Neuro:   no obvious sensory or motor deficits  Psychiatric:   normal mood and affect    EKG Today and independently reviewed by me: sinus rhythm, normal intervals and non-specific ST/T wave changes.         Lab Results   Component Value Date/Time     2022 02:55 PM    K 3.6 2022 02:55 PM     2022 02:55 PM    CO2 29 2022 02:55 PM    BUN 23 2022 02:55 PM    CREATININE 1.10 2022 02:55 PM    GLUCOSE 112 2022 02:55 PM    CALCIUM 9.9 2022 02:55 PM        Lab Results   Component Value Date    WBC 9.4 2022    HGB 12.8 2022    HCT 41.7 2022    MCV 87.1 2022     (H) 2022       Lab Results   Component Value Date    TSH 1.700 2020       No results found for: LABA1C  No results found for: EAG    Lab Results   Component Value Date    CHOL 224 (H) 2020     Lab Results   Component Value Date    TRIG 125 2020     Lab Results   Component Value Date    HDL 54 03/16/2020     Lab Results   Component Value Date    LDLCALC 145 (H) 03/16/2020     Lab Results   Component Value Date    LABVLDL 25 03/16/2020     No results found for: KALEE        I have Independently reviewed prior care notes, any ER records available, cardiac testing, labs and results with the patient and before seeing the patient today. Also independently reviewed outside records when available. ASSESSMENT:    Jenice Olszewski was seen today for cardiomyopathy. Diagnoses and all orders for this visit:    Central retinal vein occlusion of left eye  -     EKG 12 Lead    Hypertrophic cardiomyopathy ([address]  -     EKG 12 Lead    Essential hypertension with goal blood pressure less than 140/90    Palpitations  -     Transthoracic echocardiogram (TTE) complete with contrast, bubble, strain, and 3D PRN; Future  -     Vascular duplex carotid bilateral; Future  -     Cardiac event monitor; Future    TIA (transient ischemic attack)  -     Transthoracic echocardiogram (TTE) complete with contrast, bubble, strain, and 3D PRN; Future  -     Vascular duplex carotid bilateral; Future  -     Cardiac event monitor; Future        PLAN:     TIA:  check echo, carotid US and 3 week tele monitor with palp  Afib? Remain on ASA and statin. Palp:  check 3 week tele monitor. Check echo, assess LA size. HTN: remain on meds. HOCM? Check echo. Patient has been instructed and agrees to call our office with any issues or other concerns related to their cardiac condition(s) and/or complaint(s).         Return for Return After Test.       SHAVONNE HERRERA,   12/27/2022

## 2023-01-19 ENCOUNTER — TELEPHONE (OUTPATIENT)
Dept: CARDIOLOGY CLINIC | Age: 73
End: 2023-01-19

## 2023-01-19 NOTE — TELEPHONE ENCOUNTER
----- Message from tsumobi, DO sent at 1/19/2023  2:34 PM EST -----  Please call the patient. CAROTID US was ok, nothing major or concerning, NO BAD blockages SEEN on the study. Keep regular appointment time, no med changes. Will review more at follow up and get plan for the future.     Thanks,   Ellen Spear

## 2023-01-24 ENCOUNTER — TELEPHONE (OUTPATIENT)
Dept: CARDIOLOGY CLINIC | Age: 73
End: 2023-01-24

## 2023-01-24 NOTE — TELEPHONE ENCOUNTER
----- Message from Rosaura Castano DO sent at 1/24/2023 11:00 AM EST -----  Please call the patient. ECHO was ok, nothing major or concerning. If having more angina (worsening JOHNS, CP, SOB), please let us know. Will review more at follow up appointment and get plan for the future.     Thanks,   Rodolfo Hebert

## 2023-01-30 ENCOUNTER — OFFICE VISIT (OUTPATIENT)
Dept: CARDIOLOGY CLINIC | Age: 73
End: 2023-01-30
Payer: MEDICARE

## 2023-01-30 VITALS
HEIGHT: 67 IN | WEIGHT: 237 LBS | SYSTOLIC BLOOD PRESSURE: 134 MMHG | HEART RATE: 90 BPM | BODY MASS INDEX: 37.2 KG/M2 | DIASTOLIC BLOOD PRESSURE: 74 MMHG

## 2023-01-30 DIAGNOSIS — I10 ESSENTIAL HYPERTENSION WITH GOAL BLOOD PRESSURE LESS THAN 140/90: ICD-10-CM

## 2023-01-30 DIAGNOSIS — R00.2 PALPITATIONS: ICD-10-CM

## 2023-01-30 DIAGNOSIS — I87.2 VENOUS INSUFFICIENCY: ICD-10-CM

## 2023-01-30 DIAGNOSIS — E66.01 SEVERE OBESITY (BMI 35.0-39.9) WITH COMORBIDITY (HCC): ICD-10-CM

## 2023-01-30 DIAGNOSIS — I42.2 HYPERTROPHIC CARDIOMYOPATHY (HCC): Primary | ICD-10-CM

## 2023-01-30 DIAGNOSIS — G45.9 TIA (TRANSIENT ISCHEMIC ATTACK): ICD-10-CM

## 2023-01-30 LAB
ERYTHROCYTE [DISTWIDTH] IN BLOOD BY AUTOMATED COUNT: 15.3 % (ref 11.9–14.6)
HCT VFR BLD AUTO: 41.6 % (ref 35.8–46.3)
HGB BLD-MCNC: 12.9 G/DL (ref 11.7–15.4)
MCH RBC QN AUTO: 27.4 PG (ref 26.1–32.9)
MCHC RBC AUTO-ENTMCNC: 31 G/DL (ref 31.4–35)
MCV RBC AUTO: 88.3 FL (ref 82–102)
NRBC # BLD: 0 K/UL (ref 0–0.2)
PLATELET # BLD AUTO: 532 K/UL (ref 150–450)
PMV BLD AUTO: 10.8 FL (ref 9.4–12.3)
RBC # BLD AUTO: 4.71 M/UL (ref 4.05–5.2)
WBC # BLD AUTO: 9.6 K/UL (ref 4.3–11.1)

## 2023-01-30 PROCEDURE — 99215 OFFICE O/P EST HI 40 MIN: CPT | Performed by: INTERNAL MEDICINE

## 2023-01-30 PROCEDURE — 3075F SYST BP GE 130 - 139MM HG: CPT | Performed by: INTERNAL MEDICINE

## 2023-01-30 PROCEDURE — 3017F COLORECTAL CA SCREEN DOC REV: CPT | Performed by: INTERNAL MEDICINE

## 2023-01-30 PROCEDURE — G8484 FLU IMMUNIZE NO ADMIN: HCPCS | Performed by: INTERNAL MEDICINE

## 2023-01-30 PROCEDURE — G8399 PT W/DXA RESULTS DOCUMENT: HCPCS | Performed by: INTERNAL MEDICINE

## 2023-01-30 PROCEDURE — 3078F DIAST BP <80 MM HG: CPT | Performed by: INTERNAL MEDICINE

## 2023-01-30 PROCEDURE — 1123F ACP DISCUSS/DSCN MKR DOCD: CPT | Performed by: INTERNAL MEDICINE

## 2023-01-30 PROCEDURE — G8417 CALC BMI ABV UP PARAM F/U: HCPCS | Performed by: INTERNAL MEDICINE

## 2023-01-30 PROCEDURE — 1090F PRES/ABSN URINE INCON ASSESS: CPT | Performed by: INTERNAL MEDICINE

## 2023-01-30 PROCEDURE — 1036F TOBACCO NON-USER: CPT | Performed by: INTERNAL MEDICINE

## 2023-01-30 PROCEDURE — G8428 CUR MEDS NOT DOCUMENT: HCPCS | Performed by: INTERNAL MEDICINE

## 2023-01-30 RX ORDER — METOPROLOL SUCCINATE 25 MG/1
25 TABLET, EXTENDED RELEASE ORAL DAILY
Qty: 30 TABLET | Refills: 3 | Status: SHIPPED | OUTPATIENT
Start: 2023-01-30

## 2023-01-30 NOTE — PROGRESS NOTES
[address], [address] [facility], [address] [place] [address]  PHONE: [phone]     23    NAME:  Geremias Joel  :   MRN: [id number]       SUBJECTIVE:   Geremias Joel is a 67 y.o. female seen for a follow up visit regarding the following:     Chief Complaint   Patient presents with    Results     Echo, carotid, monitor    Hypertension    Cardiomyopathy       HPI:   Here after sudden vision loss. Eye vision loss felt to be TIA related. -----monitor showed sinus with A tach. Echo 2023:  normal EF. LA mild  Carotid US : mild    Still with left eye vision issues. No CP, JOHNS, angina. Felt to be TIA. Some palp at times. Patient denies recent history of orthopnea, PND, excessive dizziness and/or syncope. No CP. Past Medical History, Past Surgical History, Family history, Social History, and Medications were all reviewed with the patient today and updated as necessary. Current Outpatient Medications   Medication Sig Dispense Refill    metoprolol succinate (TOPROL XL) 25 MG extended release tablet Take 1 tablet by mouth daily 30 tablet 3    aspirin 81 MG EC tablet Take 81 mg by mouth daily      omeprazole (PRILOSEC) 40 MG delayed release capsule TAKE 1 CAPSULE BY MOUTH EVERY DAY      atorvastatin (LIPITOR) 40 MG tablet TAKE 1 TABLET BY MOUTH EVERY DAY      amLODIPine-valsartan-HCTZ -25 MG TABS       vitamin D (CHOLECALCIFEROL) 25 MCG (1000 UT) TABS tablet Take by mouth daily      tiZANidine (ZANAFLEX) 4 MG capsule TAKE 1 CAPSULE BY MOUTH THREE TIMES DAILY AS NEEDED (LOW BACK PAIN)      traMADol (ULTRAM) 50 MG tablet Take 50 mg by mouth every 6 hours as needed. No current facility-administered medications for this visit.         No Known Allergies  Patient Active Problem List    Diagnosis Date Noted    TIA (transient ischemic attack) 2022     Priority: Medium    Central retinal vein occlusion of left eye 2022     Priority: Medium    Family history of breast cancer 09/15/2020    Venous insufficiency 03/17/2020    Iron deficiency 11/12/2019    Severe obesity ([facility] [address] 11/12/2019    Hiatal hernia 12/07/2018  EGD - schatztk's ring, Hiatal Hernia, Roberto lesions, esophageal   web, s/p dilation        Roberto lesion, chronic 12/07/2018  EGD - schatztk's ring, Hiatal Hernia, Roberto lesions, esophageal   web, s/p dilation        Hypertrophic cardiomyopathy ([facility] [address] 11/21/2017    Depression 09/18/2017    Lumbar disc disease 08/18/2017    Colon polyps 07/26/2017     Tubular adenoma by path report 7-        Syncope 07/14/2017    Palpitations 07/14/2017    Vitamin D deficiency 05/16/2017    Chronic back pain     Obesity, Class I, BMI 30-34.9     Renal cyst, acquired, right     Essential hypertension with goal blood pressure less than 140/90 08/08/2016    Sciatica 08/14/2012    GERD (gastroesophageal reflux disease) 08/14/2012  EGD - schatztk's ring, Hiatal Hernia, Roberto lesions, esophageal   web, s/p dilation          Past Surgical History:   Procedure Laterality Date    APPENDECTOMY      CATARACT REMOVAL Bilateral     COLONOSCOPY  07/26/2017 7- - Colonoscopy - Colon polyps hepatic flexure, diverticulosis of colon - sent for scanning    LUMBAR LAMINECTOMY  12/2017    L4-5 - Dr. Esme Powell  11/2019    Dr. Power Elizabeth - redo surgery with lysis of scar as well    FRANCIS AND BSO (CERVIX REMOVED)      UPPER GASTROINTESTINAL ENDOSCOPY  12/05/2018  EGD - schatztk's ring, Hiatal Hernia, Roberto lesions, esophageal web, s/p dilation    UPPER GASTROINTESTINAL ENDOSCOPY  07/26/2017 7- - EGD- hiatal hernia, gastric erythema - very focal and nonspecific erythema- plan avoid NSAIDS, continue PPI. - sent for scanning     Family History   Problem Relation Age of Onset    Cancer Mother         breast    Diabetes Mother     Breast Cancer Mother 79    Heart Disease Father         Infection s/p CABG    Crohn's Disease Sister     Diabetes Sister     Obesity Sister     Osteoarthritis Sister         Needs Knee Replacement    Cancer Sister         breast    Breast Cancer Sister 36    Osteoarthritis Brother         Back and Knees    Psychiatric Disorder Brother      Social History     Tobacco Use    Smoking status: Former     Packs/day: 0.20     Types: Cigarettes     Quit date: 1981     Years since quittin.1    Smokeless tobacco: Never   Substance Use Topics    Alcohol use: No     Alcohol/week: 0.0 standard drinks         ROS:    Constitutional:   Negative for fevers and unexplained weight loss. Eyes:   Negative for visual disturbance. ENT:   Negative for significant hearing loss and tinnitus. Respiratory:   Negative for hemoptysis. Cardiovascular:   Negative except as noted in HPI. Gastrointestinal:   Negative for melena and abdominal pain. Genitourinary:   Negative for hematuria, renal stones. Integumentary:   Negative for rash or non-healing wounds  Hematologic/Lymphatic:   Negative for excessive bleeding hx or clotting disorder. Musculoskeletal:  Negative for active, unexplained/severe joint pain. Neurological:   Negative for stroke. Behavioral/Psych:   Negative for suicidal ideations. Endocrine:   Negative for uncontrolled diabetic symptoms including polyuria, polydipsia and poor wound healing.          PHYSICAL EXAM:     /74   Pulse 90   Ht 5' 7\" (1.702 m)   Wt 237 lb (107.5 kg)   BMI 37.12 kg/m²    General/Constitutional:   Alert and oriented x 3, no acute distress  HEENT:   normocephalic, atraumatic, moist mucous membranes  Neck:   No JVD or carotid bruits bilaterally  Cardiovascular:   regular rate and rhythm, no murmur/rub/gallop appreciated  Pulmonary:   clear to auscultation bilaterally, no respiratory distress  Abdomen:   soft, non-tender, non-distended  Ext:   No sig LE edema bilaterally  Skin:  warm and dry, no obvious rashes seen  Neuro:   no obvious sensory or motor deficits  Psychiatric:   normal mood and affect      Lab Results   Component Value Date/Time     12/08/2022 02:55 PM    K 3.6 12/08/2022 02:55 PM     12/08/2022 02:55 PM    CO2 29 12/08/2022 02:55 PM    BUN 23 12/08/2022 02:55 PM    CREATININE 1.10 12/08/2022 02:55 PM    GLUCOSE 112 12/08/2022 02:55 PM    CALCIUM 9.9 12/08/2022 02:55 PM        Lab Results   Component Value Date    WBC 9.4 12/08/2022    HGB 12.8 12/08/2022    HCT 41.7 12/08/2022    MCV 87.1 12/08/2022     (H) 12/08/2022       Lab Results   Component Value Date    TSH 1.700 03/16/2020       No results found for: LABA1C  No results found for: EAG    Lab Results   Component Value Date    CHOL 224 (H) 03/16/2020     Lab Results   Component Value Date    TRIG 125 03/16/2020     Lab Results   Component Value Date    HDL 54 03/16/2020     Lab Results   Component Value Date    LDLCALC 145 (H) 03/16/2020     Lab Results   Component Value Date    LABVLDL 25 03/16/2020     No results found for: CHOLHDLRATIO        I have Independently reviewed prior care notes, any ER records available, cardiac testing, labs and results with the patient and before seeing the patient today. Also independently reviewed outside records when available. ASSESSMENT:    Terri Beach was seen today for results, hypertension and cardiomyopathy. Diagnoses and all orders for this visit:    Hypertrophic cardiomyopathy ([facility] [address]    Severe obesity (BMI 35.0-39. 9) with comorbidity (HCC)    Palpitations  -     CBC; Future  -     Basic Metabolic Panel; Future  -     TSH; Future  -     Magnesium; Future  -     Case Request Cardiac Cath Lab    Essential hypertension with goal blood pressure less than 140/90    Venous insufficiency    TIA (transient ischemic attack)    Other orders  -     metoprolol succinate (TOPROL XL) 25 MG extended release tablet; Take 1 tablet by mouth daily        PLAN:   TIA:  echo and carotid ok. TIA felt to be embolic. No AF seen.   Plan for loop monitor to follow. Remain on ASA and statin. Plan for Loop monitor placement given TIA. Discussed risk of with the patient in detail. These risks include, but are not limited to, bleeding, stroke, heart attack, cardiac arrhythmias, allergic reactions, atheroemboli, acute kidney injury and cardiac arrest/death. Local complications at the site of insertion were also reviewed and discussed. The patient voiced complete understanding about these risks. The patient agrees to proceed with the aforementioned associated risks. Palp: loop planned. EF normal.    HTN: halve the ARB/norvasc/HCTZ, add toprol in AM.    HOCM:  No real HOCM seen, follow,  EF of 70 - 75%. Left ventricle size is normal. Normal wall thickness. 25mm Mid Cavity gradient associated with hyperdynamic LV function. Patient has been instructed and agrees to call our office with any issues or other concerns related to their cardiac condition(s) and/or complaint(s). Return in about 2 months (around 3/30/2023).        SHAVONNE HERRERA, DO  1/30/2023

## 2023-01-31 LAB
ANION GAP SERPL CALC-SCNC: 10 MMOL/L (ref 2–11)
BUN SERPL-MCNC: 20 MG/DL (ref 8–23)
CALCIUM SERPL-MCNC: 9.8 MG/DL (ref 8.3–10.4)
CHLORIDE SERPL-SCNC: 105 MMOL/L (ref 101–110)
CO2 SERPL-SCNC: 27 MMOL/L (ref 21–32)
CREAT SERPL-MCNC: 1.2 MG/DL (ref 0.6–1)
GLUCOSE SERPL-MCNC: 117 MG/DL (ref 65–100)
MAGNESIUM SERPL-MCNC: 2.4 MG/DL (ref 1.8–2.4)
POTASSIUM SERPL-SCNC: 4.2 MMOL/L (ref 3.5–5.1)
SODIUM SERPL-SCNC: 142 MMOL/L (ref 133–143)
TSH, 3RD GENERATION: 1.3 UIU/ML (ref 0.36–3.74)

## 2023-01-31 NOTE — PROGRESS NOTES
Patient pre-assessment complete for Davie Yao scheduled for Loop, arrival time 0900. Patient verified using . NPO status reinforced. Patient instructed to HOLD HCTZ medication. Instructed they can take all other medications excluding vitamins & supplements. Patient verbalizes understanding of all instructions & denies any questions at this time.

## 2023-02-01 ENCOUNTER — HOSPITAL ENCOUNTER (OUTPATIENT)
Age: 73
Setting detail: OUTPATIENT SURGERY
Discharge: HOME | End: 2023-02-01
Attending: INTERNAL MEDICINE | Admitting: INTERNAL MEDICINE
Payer: MEDICARE

## 2023-02-01 VITALS
BODY MASS INDEX: 37.2 KG/M2 | OXYGEN SATURATION: 96 % | SYSTOLIC BLOOD PRESSURE: 133 MMHG | TEMPERATURE: 98.1 F | HEIGHT: 67 IN | RESPIRATION RATE: 16 BRPM | WEIGHT: 237 LBS | DIASTOLIC BLOOD PRESSURE: 75 MMHG | HEART RATE: 62 BPM

## 2023-02-01 DIAGNOSIS — R00.2 PALPITATIONS: ICD-10-CM

## 2023-02-01 DIAGNOSIS — G45.9 TIA (TRANSIENT ISCHEMIC ATTACK): ICD-10-CM

## 2023-02-01 LAB — ECHO BSA: 2.25 M2

## 2023-02-01 PROCEDURE — 2500000003 HC RX 250 WO HCPCS: Performed by: INTERNAL MEDICINE

## 2023-02-01 PROCEDURE — 33285 INSJ SUBQ CAR RHYTHM MNTR: CPT | Performed by: INTERNAL MEDICINE

## 2023-02-01 PROCEDURE — 6360000002 HC RX W HCPCS: Performed by: INTERNAL MEDICINE

## 2023-02-01 PROCEDURE — 2709999900 HC NON-CHARGEABLE SUPPLY: Performed by: INTERNAL MEDICINE

## 2023-02-01 PROCEDURE — C1764 EVENT RECORDER, CARDIAC: HCPCS | Performed by: INTERNAL MEDICINE

## 2023-02-01 RX ORDER — SODIUM CHLORIDE 9 MG/ML
INJECTION, SOLUTION INTRAVENOUS CONTINUOUS
Status: DISCONTINUED | OUTPATIENT
Start: 2023-02-01 | End: 2023-02-01 | Stop reason: HOSPADM

## 2023-02-01 RX ORDER — LIDOCAINE HYDROCHLORIDE 10 MG/ML
INJECTION, SOLUTION INFILTRATION; PERINEURAL PRN
Status: DISCONTINUED | OUTPATIENT
Start: 2023-02-01 | End: 2023-02-01 | Stop reason: HOSPADM

## 2023-02-01 RX ORDER — ASPIRIN 81 MG/1
324 TABLET, CHEWABLE ORAL ONCE
Status: DISCONTINUED | OUTPATIENT
Start: 2023-02-01 | End: 2023-02-01

## 2023-02-01 NOTE — PROGRESS NOTES
TRANSFER - OUT REPORT:    Verbal report given to Pricila vu RN on Charisse Beth  being transferred to [facility] for routine progression of patient care       Report consisted of patient's Situation, Background, Assessment and   Recommendations(SBAR). Information from the following report(s) Nurse Handoff Report was reviewed with the receiving nurse.     [employer] loop recorder insertion with Dr Kendra Galo  Left chest incision closed with sutures  Covered with primaseal  No sedation  Ancef 2g  Pt tolerated well

## 2023-02-01 NOTE — DISCHARGE INSTRUCTIONS
LOOP MONITOR INSTRUCTIONS SHEET      Activity  As tolerated and directed by your doctor  Bathe or shower as directed by your doctor    Diet  Resume your previous diet     Pain   Call your doctor if pain is NOT relieved by OTC medication    Dressing Care: Leave dressing on the incision left chest. If dressing becomes loose,  You may remove it. Keep area Clean & dry, Do not get incision wet or  let water run over incision. Do not apply any lotions, creams or powder to incision. Follow-Up Phone Calls  Will be made nursing staff  If you have any problems, call your doctor as needed    Call your doctor if  Excessive bleeding that does not stop after holding mild pressure over the area  Temperature of 101 degrees F or above  Redness,excessive swelling or bruising, and/or green or yellow, smelly discharge from incision    After Anesthesia  For the first 24 hours: DO NOT Drive, Drink alcoholic beverages, or Make important decisions  Be aware of dizziness following anesthesia and while taking pain medication    Medications - Continue home medications as previously prescribed     Other Instructions- Always show the doctor or dentist your loop recorder identification card.       Appointment date/time - Feb 17, 2023 at 2:00pm    Your doctor's phone number - [facility] [phone]

## 2023-02-01 NOTE — PROGRESS NOTES
Discharge instructions given per orders, voiced good understanding of left chest care, medications & follow up care.  Denies any questions

## 2023-02-01 NOTE — Clinical Note
Prepped: left chest and left subclavian. Site was prepped. Prepped with: ChloraPrep. Patient was draped after wet prep solution dried.

## 2023-02-01 NOTE — PROGRESS NOTES
Patient received to [address] [place] [address] room # 11  Ambulatory from [facility]. Patient scheduled for loop recorder today with Dr Shannon Luna. Procedure reviewed & questions answered, voiced good understanding consent obtained & placed on chart. All medications and medical history reviewed. Will prep patient per orders. Patient & family updated on plan of care. The patient has a fraility score of 3-MANAGING WELL, based on ambulation.

## 2023-03-24 ENCOUNTER — OFFICE VISIT (OUTPATIENT)
Dept: CARDIOLOGY CLINIC | Age: 73
End: 2023-03-24
Payer: MEDICARE

## 2023-03-24 VITALS
WEIGHT: 249 LBS | HEIGHT: 67 IN | BODY MASS INDEX: 39.08 KG/M2 | DIASTOLIC BLOOD PRESSURE: 78 MMHG | HEART RATE: 75 BPM | SYSTOLIC BLOOD PRESSURE: 148 MMHG

## 2023-03-24 DIAGNOSIS — I10 ESSENTIAL HYPERTENSION WITH GOAL BLOOD PRESSURE LESS THAN 140/90: ICD-10-CM

## 2023-03-24 DIAGNOSIS — I87.2 VENOUS INSUFFICIENCY: ICD-10-CM

## 2023-03-24 DIAGNOSIS — R00.2 PALPITATIONS: ICD-10-CM

## 2023-03-24 DIAGNOSIS — I42.2 HYPERTROPHIC CARDIOMYOPATHY (HCC): Primary | ICD-10-CM

## 2023-03-24 DIAGNOSIS — G45.9 TIA (TRANSIENT ISCHEMIC ATTACK): ICD-10-CM

## 2023-03-24 PROCEDURE — 3017F COLORECTAL CA SCREEN DOC REV: CPT | Performed by: INTERNAL MEDICINE

## 2023-03-24 PROCEDURE — 3078F DIAST BP <80 MM HG: CPT | Performed by: INTERNAL MEDICINE

## 2023-03-24 PROCEDURE — 3077F SYST BP >= 140 MM HG: CPT | Performed by: INTERNAL MEDICINE

## 2023-03-24 PROCEDURE — 99214 OFFICE O/P EST MOD 30 MIN: CPT | Performed by: INTERNAL MEDICINE

## 2023-03-24 PROCEDURE — G8417 CALC BMI ABV UP PARAM F/U: HCPCS | Performed by: INTERNAL MEDICINE

## 2023-03-24 PROCEDURE — G8484 FLU IMMUNIZE NO ADMIN: HCPCS | Performed by: INTERNAL MEDICINE

## 2023-03-24 PROCEDURE — 1036F TOBACCO NON-USER: CPT | Performed by: INTERNAL MEDICINE

## 2023-03-24 PROCEDURE — 1090F PRES/ABSN URINE INCON ASSESS: CPT | Performed by: INTERNAL MEDICINE

## 2023-03-24 PROCEDURE — G8428 CUR MEDS NOT DOCUMENT: HCPCS | Performed by: INTERNAL MEDICINE

## 2023-03-24 PROCEDURE — 1123F ACP DISCUSS/DSCN MKR DOCD: CPT | Performed by: INTERNAL MEDICINE

## 2023-03-24 PROCEDURE — G8399 PT W/DXA RESULTS DOCUMENT: HCPCS | Performed by: INTERNAL MEDICINE

## 2023-03-24 RX ORDER — CALCIUM CARBONATE/VITAMIN D3 500 MG-10
1 TABLET,CHEWABLE ORAL DAILY
COMMUNITY
Start: 2023-03-07

## 2023-03-24 RX ORDER — ALENDRONATE SODIUM 70 MG/1
TABLET ORAL
COMMUNITY
Start: 2023-03-07

## 2023-03-24 RX ORDER — VALSARTAN AND HYDROCHLOROTHIAZIDE 320; 12.5 MG/1; MG/1
1 TABLET, FILM COATED ORAL DAILY
COMMUNITY
Start: 2023-03-07

## 2023-03-24 NOTE — PROGRESS NOTES
lesions, esophageal web, s/p dilation    UPPER GASTROINTESTINAL ENDOSCOPY  2017 - EGD- hiatal hernia, gastric erythema - very focal and nonspecific erythema- plan avoid NSAIDS, continue PPI. - sent for scanning     Family History   Problem Relation Age of Onset    Cancer Mother         breast    Diabetes Mother     Breast Cancer Mother 79    Heart Disease Father         Infection s/p CABG    Crohn's Disease Sister     Diabetes Sister     Obesity Sister     Osteoarthritis Sister         Needs Knee Replacement    Cancer Sister         breast    Breast Cancer Sister 36    Osteoarthritis Brother         Back and Knees    Psychiatric Disorder Brother      Social History     Tobacco Use    Smoking status: Former     Packs/day: 0.20     Types: Cigarettes     Quit date: 1981     Years since quittin.2    Smokeless tobacco: Never   Substance Use Topics    Alcohol use: No     Alcohol/week: 0.0 standard drinks         ROS:    No obvious pertinent positives on review of systems except for what was outlined in the HPI today.       PHYSICAL EXAM:     BP (!) 148/78   Pulse 75   Ht 5' 7\" (1.702 m)   Wt 249 lb (112.9 kg)   BMI 39.00 kg/m²    General/Constitutional:   Alert and oriented x 3, no acute distress  HEENT:   normocephalic, atraumatic, moist mucous membranes  Neck:   No JVD or carotid bruits bilaterally  Cardiovascular:   regular rate and rhythm, no murmur/rub/gallop appreciated  Pulmonary:   clear to auscultation bilaterally, no respiratory distress  Abdomen:   soft, non-tender, non-distended  Ext:   No sig LE edema bilaterally  Skin:  warm and dry, no obvious rashes seen  Neuro:   no obvious sensory or motor deficits  Psychiatric:   normal mood and affect      Lab Results   Component Value Date/Time     2023 08:53 AM    K 4.2 2023 08:53 AM     2023 08:53 AM    CO2 27 2023 08:53 AM    BUN 20 2023 08:53 AM    CREATININE 1.20 2023 08:53 AM

## 2023-05-03 DIAGNOSIS — Z95.818 STATUS POST PLACEMENT OF IMPLANTABLE LOOP RECORDER: ICD-10-CM

## 2023-05-03 DIAGNOSIS — G45.9 TIA (TRANSIENT ISCHEMIC ATTACK): ICD-10-CM

## 2023-05-03 DIAGNOSIS — G45.9 TIA (TRANSIENT ISCHEMIC ATTACK): Primary | ICD-10-CM

## 2023-05-05 DIAGNOSIS — Z95.818 IMPLANTABLE LOOP RECORDER PRESENT: ICD-10-CM

## 2023-05-05 DIAGNOSIS — I47.1 ATRIAL TACHYCARDIA (HCC): Primary | ICD-10-CM

## 2023-06-05 DIAGNOSIS — Z95.818 STATUS POST PLACEMENT OF IMPLANTABLE LOOP RECORDER: ICD-10-CM

## 2023-06-05 DIAGNOSIS — G45.9 TIA (TRANSIENT ISCHEMIC ATTACK): ICD-10-CM

## 2023-06-05 RX ORDER — METOPROLOL SUCCINATE 25 MG/1
25 TABLET, EXTENDED RELEASE ORAL DAILY
Qty: 30 TABLET | Refills: 5 | Status: SHIPPED | OUTPATIENT
Start: 2023-06-05

## 2023-06-27 PROCEDURE — 93298 REM INTERROG DEV EVAL SCRMS: CPT | Performed by: INTERNAL MEDICINE

## 2023-06-27 PROCEDURE — G2066 INTER DEVC REMOTE 30D: HCPCS | Performed by: INTERNAL MEDICINE

## 2023-08-01 PROCEDURE — G2066 INTER DEVC REMOTE 30D: HCPCS | Performed by: INTERNAL MEDICINE

## 2023-08-01 PROCEDURE — 93298 REM INTERROG DEV EVAL SCRMS: CPT | Performed by: INTERNAL MEDICINE

## 2023-09-15 PROCEDURE — 93298 REM INTERROG DEV EVAL SCRMS: CPT | Performed by: INTERNAL MEDICINE

## 2023-09-15 PROCEDURE — G2066 INTER DEVC REMOTE 30D: HCPCS | Performed by: INTERNAL MEDICINE

## 2023-11-20 PROCEDURE — G2066 INTER DEVC REMOTE 30D: HCPCS | Performed by: INTERNAL MEDICINE

## 2023-11-20 PROCEDURE — 93298 REM INTERROG DEV EVAL SCRMS: CPT | Performed by: INTERNAL MEDICINE

## 2023-12-27 ENCOUNTER — TELEPHONE (OUTPATIENT)
Age: 73
End: 2023-12-27

## 2023-12-27 NOTE — TELEPHONE ENCOUNTER
Murj alert for 56 second possible AF/ AT episode V rates in the 180's. Reviewed w/ EP and confirmed possible afib. Loop placed for hx of TIA. EP advised AC. Patient called and episode and hx of TIA was dicussed. Patient declines AC at this time. \" I was not taking my medication and that's why.\" Explained this may not have been the reason w/ TIA hx. Patient declined [facility] [place] and wants to monitor for further episodes. Advised cardiology appt and patient requested next month.

## 2023-12-28 PROCEDURE — 93298 REM INTERROG DEV EVAL SCRMS: CPT | Performed by: INTERNAL MEDICINE

## 2023-12-28 PROCEDURE — G2066 INTER DEVC REMOTE 30D: HCPCS | Performed by: INTERNAL MEDICINE

## 2024-01-04 NOTE — TELEPHONE ENCOUNTER
MEDICATION REFILL REQUEST      Name of Medication:  Metoprolol   Dose:    Frequency:    Quantity:    Days' supply:        Pharmacy Name/Location:  [facility] on [address]Please call in today and if a problem doing this ,call patient

## 2024-01-05 RX ORDER — METOPROLOL SUCCINATE 25 MG/1
25 TABLET, EXTENDED RELEASE ORAL DAILY
Qty: 30 TABLET | Refills: 5 | Status: SHIPPED | OUTPATIENT
Start: 2024-01-05

## 2024-01-05 NOTE — TELEPHONE ENCOUNTER
Requested Prescriptions     Pending Prescriptions Disp Refills    metoprolol succinate (TOPROL XL) 25 MG extended release tablet 30 tablet 5     Sig: Take 1 tablet by mouth daily     Rx verified

## 2024-05-03 PROCEDURE — 93298 REM INTERROG DEV EVAL SCRMS: CPT | Performed by: INTERNAL MEDICINE

## 2024-09-17 ENCOUNTER — HOSPITAL ENCOUNTER (OUTPATIENT)
Dept: MRI IMAGING | Age: 74
Discharge: HOME | End: 2024-09-20
Payer: MEDICARE

## 2024-09-17 DIAGNOSIS — M54.16 LUMBAR RADICULOPATHY: ICD-10-CM

## 2024-09-17 PROCEDURE — 6360000004 HC RX CONTRAST MEDICATION: Performed by: PHYSICIAN ASSISTANT

## 2024-09-17 PROCEDURE — 72158 MRI LUMBAR SPINE W/O & W/DYE: CPT

## 2024-09-17 PROCEDURE — A9579 GAD-BASE MR CONTRAST NOS,1ML: HCPCS | Performed by: PHYSICIAN ASSISTANT

## 2024-09-17 RX ADMIN — GADOTERIDOL 24 ML: 279.3 INJECTION, SOLUTION INTRAVENOUS at 08:38

## 2024-11-05 PROCEDURE — 93298 REM INTERROG DEV EVAL SCRMS: CPT | Performed by: INTERNAL MEDICINE

## 2025-07-07 ENCOUNTER — TELEPHONE (OUTPATIENT)
Age: 75
End: 2025-07-07

## 2025-07-07 DIAGNOSIS — I47.29 NSVT (NONSUSTAINED VENTRICULAR TACHYCARDIA) (HCC): Primary | ICD-10-CM

## 2025-07-07 DIAGNOSIS — R55 SYNCOPAL EPISODES: ICD-10-CM

## 2025-07-07 NOTE — TELEPHONE ENCOUNTER
Gabriele alert from [employer] loop for 2 episodes of NSVT vs SVT on Saturday around 10:30 am. Rates around 200    Patient reports she passed out but then she went to a BBQ. Last OV 2023

## 2025-07-07 NOTE — TELEPHONE ENCOUNTER
Spoke with patient and scheduled echo for tomorrow. She will gets labs completed after.     Scheduled OV at next available on August 25th.     Patient verbalized understanding and will call with anything additional.

## 2025-07-14 ENCOUNTER — RESULTS FOLLOW-UP (OUTPATIENT)
Dept: CARDIOLOGY CLINIC | Age: 75
End: 2025-07-14

## 2025-07-15 PROCEDURE — 93298 REM INTERROG DEV EVAL SCRMS: CPT | Performed by: INTERNAL MEDICINE

## 2025-07-15 NOTE — TELEPHONE ENCOUNTER
----- Message from Dr. Tim Newsome, DO sent at 7/14/2025  4:51 PM EDT -----  Please call the patient.    ECHO was ok, nothing major or concerning.  Good news.   If having more angina,exertion related chest pain/pressure, worsening shortness of breath then please let us know. Will review more at follow up appointment and get plan for the future.    Thanks,   Jerod     Quality 226: Preventive Care And Screening: Tobacco Use: Screening And Cessation Intervention: Tobacco Screening not Performed for Unknown Reasons Detail Level: Detailed

## 2025-08-15 PROCEDURE — 93298 REM INTERROG DEV EVAL SCRMS: CPT | Performed by: INTERNAL MEDICINE

## 2025-09-03 ENCOUNTER — HOSPITAL ENCOUNTER (EMERGENCY)
Age: 75
Discharge: ANOTHER HOSPITAL | End: 2025-09-03
Attending: EMERGENCY MEDICINE
Payer: MEDICARE

## 2025-09-03 ENCOUNTER — APPOINTMENT (OUTPATIENT)
Dept: GENERAL RADIOLOGY | Age: 75
End: 2025-09-03
Payer: MEDICARE

## 2025-09-03 ENCOUNTER — APPOINTMENT (OUTPATIENT)
Dept: CT IMAGING | Age: 75
End: 2025-09-03
Payer: MEDICARE

## 2025-09-03 VITALS
SYSTOLIC BLOOD PRESSURE: 154 MMHG | DIASTOLIC BLOOD PRESSURE: 86 MMHG | RESPIRATION RATE: 18 BRPM | HEIGHT: 64 IN | BODY MASS INDEX: 39.4 KG/M2 | TEMPERATURE: 97.5 F | WEIGHT: 230.8 LBS | HEART RATE: 81 BPM | OXYGEN SATURATION: 100 %

## 2025-09-03 DIAGNOSIS — R41.82 ALTERED MENTAL STATUS, UNSPECIFIED ALTERED MENTAL STATUS TYPE: Primary | ICD-10-CM

## 2025-09-03 DIAGNOSIS — I63.9 CEREBROVASCULAR ACCIDENT (CVA), UNSPECIFIED MECHANISM (HCC): ICD-10-CM

## 2025-09-03 LAB
ALBUMIN SERPL-MCNC: 3.4 G/DL (ref 3.2–4.6)
ALBUMIN/GLOB SERPL: 0.9 (ref 1–1.9)
ALP SERPL-CCNC: 68 U/L (ref 35–104)
ALT SERPL-CCNC: 21 U/L (ref 8–45)
ANION GAP BLD CALC-SCNC: ABNORMAL MMOL/L
ANION GAP SERPL CALC-SCNC: 15 MMOL/L (ref 7–16)
APPEARANCE UR: ABNORMAL
ARTERIAL PATENCY WRIST A: POSITIVE
AST SERPL-CCNC: 43 U/L (ref 15–37)
BACTERIA URNS QL MICRO: ABNORMAL /HPF
BASE DEFICIT BLD-SCNC: 3.5 MMOL/L
BASOPHILS # BLD: 0.07 K/UL (ref 0–0.2)
BASOPHILS NFR BLD: 0.5 % (ref 0–2)
BDY SITE: ABNORMAL
BILIRUB SERPL-MCNC: 0.3 MG/DL (ref 0–1.2)
BILIRUB UR QL: NEGATIVE
BUN SERPL-MCNC: 15 MG/DL (ref 8–23)
CA-I BLD-MCNC: 1.24 MMOL/L (ref 1.12–1.32)
CALCIUM SERPL-MCNC: 9.7 MG/DL (ref 8.8–10.2)
CASTS URNS QL MICRO: ABNORMAL /LPF
CHLORIDE SERPL-SCNC: 107 MMOL/L (ref 98–107)
CO2 BLD-SCNC: 20 MMOL/L (ref 13–23)
CO2 SERPL-SCNC: 18 MMOL/L (ref 20–29)
COLOR UR: ABNORMAL
CREAT SERPL-MCNC: 0.87 MG/DL (ref 0.6–1.1)
DIFFERENTIAL METHOD BLD: ABNORMAL
EOSINOPHIL # BLD: 0.27 K/UL (ref 0–0.8)
EOSINOPHIL NFR BLD: 2 % (ref 0.5–7.8)
EPI CELLS #/AREA URNS HPF: ABNORMAL /HPF
ERYTHROCYTE [DISTWIDTH] IN BLOOD BY AUTOMATED COUNT: 20.3 % (ref 11.9–14.6)
GAS FLOW.O2 O2 DELIVERY SYS: ABNORMAL
GLOBULIN SER CALC-MCNC: 3.8 G/DL (ref 2.3–3.5)
GLUCOSE BLD STRIP.AUTO-MCNC: 137 MG/DL (ref 65–100)
GLUCOSE SERPL-MCNC: 127 MG/DL (ref 70–99)
GLUCOSE UR STRIP.AUTO-MCNC: NEGATIVE MG/DL
HCO3 BLD-SCNC: 21 MMOL/L (ref 21–28)
HCT VFR BLD AUTO: 31.5 % (ref 35.8–46.3)
HGB BLD-MCNC: 9.2 G/DL (ref 11.7–15.4)
HGB UR QL STRIP: NEGATIVE
IMM GRANULOCYTES # BLD AUTO: 0.04 K/UL (ref 0–0.5)
IMM GRANULOCYTES NFR BLD AUTO: 0.3 % (ref 0–5)
KETONES UR QL STRIP.AUTO: ABNORMAL MG/DL
LEUKOCYTE ESTERASE UR QL STRIP.AUTO: NEGATIVE
LYMPHOCYTES # BLD: 5.67 K/UL (ref 0.5–4.6)
LYMPHOCYTES NFR BLD: 41.9 % (ref 13–44)
MAGNESIUM SERPL-MCNC: 2.1 MG/DL (ref 1.8–2.4)
MCH RBC QN AUTO: 20.8 PG (ref 26.1–32.9)
MCHC RBC AUTO-ENTMCNC: 29.2 G/DL (ref 31.4–35)
MCV RBC AUTO: 71.1 FL (ref 82–102)
MONOCYTES # BLD: 1.14 K/UL (ref 0.1–1.3)
MONOCYTES NFR BLD: 8.4 % (ref 4–12)
MUCOUS THREADS URNS QL MICRO: ABNORMAL /LPF
NEUTS SEG # BLD: 6.33 K/UL (ref 1.7–8.2)
NEUTS SEG NFR BLD: 46.9 % (ref 43–78)
NITRITE UR QL STRIP.AUTO: NEGATIVE
NRBC # BLD: 0 K/UL (ref 0–0.2)
OTHER OBSERVATIONS: ABNORMAL
PCO2 BLD: 35.2 MMHG (ref 35–45)
PH BLD: 7.38 (ref 7.35–7.45)
PH UR STRIP: 5 (ref 5–9)
PLATELET # BLD AUTO: 726 K/UL (ref 150–450)
PMV BLD AUTO: 9.7 FL (ref 9.4–12.3)
PO2 BLD: 127 MMHG (ref 75–100)
POTASSIUM BLD-SCNC: 3.6 MMOL/L (ref 3.5–5.1)
POTASSIUM SERPL-SCNC: 5.1 MMOL/L (ref 3.5–5.1)
PROT SERPL-MCNC: 7.2 G/DL (ref 6.3–8.2)
PROT UR STRIP-MCNC: 30 MG/DL
RBC # BLD AUTO: 4.43 M/UL (ref 4.05–5.2)
RBC #/AREA URNS HPF: ABNORMAL /HPF
SAO2 % BLD: 99 %
SERVICE CMNT-IMP: ABNORMAL
SODIUM BLD-SCNC: 145 MMOL/L (ref 136–145)
SODIUM SERPL-SCNC: 140 MMOL/L (ref 136–145)
SP GR UR REFRACTOMETRY: 1.03 (ref 1–1.02)
SPECIMEN SITE: ABNORMAL
TROPONIN T SERPL HS-MCNC: 25.7 NG/L (ref 0–14)
TROPONIN T SERPL HS-MCNC: 6 NG/L (ref 0–14)
UROBILINOGEN UR QL STRIP.AUTO: 1 EU/DL (ref 0.2–1)
WBC # BLD AUTO: 13.5 K/UL (ref 4.3–11.1)
WBC URNS QL MICRO: ABNORMAL /HPF

## 2025-09-03 PROCEDURE — 84132 ASSAY OF SERUM POTASSIUM: CPT

## 2025-09-03 PROCEDURE — 31500 INSERT EMERGENCY AIRWAY: CPT

## 2025-09-03 PROCEDURE — 96374 THER/PROPH/DIAG INJ IV PUSH: CPT

## 2025-09-03 PROCEDURE — 85025 COMPLETE CBC W/AUTO DIFF WBC: CPT

## 2025-09-03 PROCEDURE — 2500000003 HC RX 250 WO HCPCS: Performed by: EMERGENCY MEDICINE

## 2025-09-03 PROCEDURE — 70450 CT HEAD/BRAIN W/O DYE: CPT

## 2025-09-03 PROCEDURE — 84295 ASSAY OF SERUM SODIUM: CPT

## 2025-09-03 PROCEDURE — 36600 WITHDRAWAL OF ARTERIAL BLOOD: CPT

## 2025-09-03 PROCEDURE — 96376 TX/PRO/DX INJ SAME DRUG ADON: CPT

## 2025-09-03 PROCEDURE — 71045 X-RAY EXAM CHEST 1 VIEW: CPT

## 2025-09-03 PROCEDURE — 82947 ASSAY GLUCOSE BLOOD QUANT: CPT

## 2025-09-03 PROCEDURE — 70498 CT ANGIOGRAPHY NECK: CPT

## 2025-09-03 PROCEDURE — 6360000004 HC RX CONTRAST MEDICATION: Performed by: EMERGENCY MEDICINE

## 2025-09-03 PROCEDURE — 99285 EMERGENCY DEPT VISIT HI MDM: CPT

## 2025-09-03 PROCEDURE — 81001 URINALYSIS AUTO W/SCOPE: CPT

## 2025-09-03 PROCEDURE — 84484 ASSAY OF TROPONIN QUANT: CPT

## 2025-09-03 PROCEDURE — 82803 BLOOD GASES ANY COMBINATION: CPT

## 2025-09-03 PROCEDURE — 82962 GLUCOSE BLOOD TEST: CPT

## 2025-09-03 PROCEDURE — 82330 ASSAY OF CALCIUM: CPT

## 2025-09-03 PROCEDURE — 6360000002 HC RX W HCPCS: Performed by: EMERGENCY MEDICINE

## 2025-09-03 PROCEDURE — 2700000000 HC OXYGEN THERAPY PER DAY

## 2025-09-03 PROCEDURE — 80053 COMPREHEN METABOLIC PANEL: CPT

## 2025-09-03 PROCEDURE — 96375 TX/PRO/DX INJ NEW DRUG ADDON: CPT

## 2025-09-03 PROCEDURE — 93005 ELECTROCARDIOGRAM TRACING: CPT | Performed by: EMERGENCY MEDICINE

## 2025-09-03 PROCEDURE — 83735 ASSAY OF MAGNESIUM: CPT

## 2025-09-03 RX ORDER — SODIUM CHLORIDE 9 MG/ML
INJECTION, SOLUTION INTRAVENOUS PRN
Status: DISCONTINUED | OUTPATIENT
Start: 2025-09-03 | End: 2025-09-03 | Stop reason: HOSPADM

## 2025-09-03 RX ORDER — ROCURONIUM BROMIDE 10 MG/ML
0.6 INJECTION, SOLUTION INTRAVENOUS
Status: COMPLETED | OUTPATIENT
Start: 2025-09-03 | End: 2025-09-03

## 2025-09-03 RX ORDER — SODIUM CHLORIDE 0.9 % (FLUSH) 0.9 %
10 SYRINGE (ML) INJECTION ONCE
Status: COMPLETED | OUTPATIENT
Start: 2025-09-03 | End: 2025-09-03

## 2025-09-03 RX ORDER — ETOMIDATE 2 MG/ML
20 INJECTION INTRAVENOUS
Status: COMPLETED | OUTPATIENT
Start: 2025-09-03 | End: 2025-09-03

## 2025-09-03 RX ORDER — IOPAMIDOL 755 MG/ML
60 INJECTION, SOLUTION INTRAVASCULAR
Status: COMPLETED | OUTPATIENT
Start: 2025-09-03 | End: 2025-09-03

## 2025-09-03 RX ORDER — ONDANSETRON 2 MG/ML
4 INJECTION INTRAMUSCULAR; INTRAVENOUS
Status: COMPLETED | OUTPATIENT
Start: 2025-09-03 | End: 2025-09-03

## 2025-09-03 RX ORDER — SODIUM CHLORIDE 0.9 % (FLUSH) 0.9 %
5-40 SYRINGE (ML) INJECTION EVERY 12 HOURS SCHEDULED
Status: DISCONTINUED | OUTPATIENT
Start: 2025-09-03 | End: 2025-09-03 | Stop reason: HOSPADM

## 2025-09-03 RX ORDER — SODIUM CHLORIDE 0.9 % (FLUSH) 0.9 %
5-40 SYRINGE (ML) INJECTION PRN
Status: DISCONTINUED | OUTPATIENT
Start: 2025-09-03 | End: 2025-09-03 | Stop reason: HOSPADM

## 2025-09-03 RX ADMIN — SODIUM CHLORIDE, PRESERVATIVE FREE 10 ML: 5 INJECTION INTRAVENOUS at 18:41

## 2025-09-03 RX ADMIN — Medication 25 MG: at 18:40

## 2025-09-03 RX ADMIN — FENTANYL CITRATE 25 MCG: 50 INJECTION INTRAMUSCULAR; INTRAVENOUS at 18:46

## 2025-09-03 RX ADMIN — ROCURONIUM BROMIDE 63 MG: 10 INJECTION, SOLUTION INTRAVENOUS at 15:36

## 2025-09-03 RX ADMIN — FENTANYL CITRATE 25 MCG: 50 INJECTION INTRAMUSCULAR; INTRAVENOUS at 16:36

## 2025-09-03 RX ADMIN — SODIUM CHLORIDE, PRESERVATIVE FREE 10 ML: 5 INJECTION INTRAVENOUS at 18:39

## 2025-09-03 RX ADMIN — IOPAMIDOL 60 ML: 755 INJECTION, SOLUTION INTRAVENOUS at 17:49

## 2025-09-03 RX ADMIN — ONDANSETRON 4 MG: 2 INJECTION, SOLUTION INTRAMUSCULAR; INTRAVENOUS at 15:05

## 2025-09-03 RX ADMIN — ETOMIDATE INJECTION 20 MG: 2 SOLUTION INTRAVENOUS at 15:34

## 2025-09-03 ASSESSMENT — PAIN - FUNCTIONAL ASSESSMENT: PAIN_FUNCTIONAL_ASSESSMENT: FACE, LEGS, ACTIVITY, CRY, AND CONSOLABILITY (FLACC)

## 2025-09-03 ASSESSMENT — PULMONARY FUNCTION TESTS: PIF_VALUE: 20

## 2025-09-04 LAB
EKG ATRIAL RATE: 76 BPM
EKG DIAGNOSIS: NORMAL
EKG P AXIS: 89 DEGREES
EKG P-R INTERVAL: 187 MS
EKG Q-T INTERVAL: 424 MS
EKG QRS DURATION: 96 MS
EKG QTC CALCULATION (BAZETT): 477 MS
EKG R AXIS: -38 DEGREES
EKG T AXIS: 59 DEGREES
EKG VENTRICULAR RATE: 76 BPM
GLUCOSE BLD STRIP.AUTO-MCNC: 107 MG/DL (ref 65–100)
SERVICE CMNT-IMP: ABNORMAL

## 2025-09-04 PROCEDURE — 93010 ELECTROCARDIOGRAM REPORT: CPT | Performed by: INTERNAL MEDICINE

## (undated) DEVICE — PENCIL ES L12IN BAYNT MPLR DISP BOVIE

## (undated) DEVICE — 2000CC GUARDIAN II: Brand: GUARDIAN

## (undated) DEVICE — SOLUTION IV 1000ML 0.9% SOD CHL

## (undated) DEVICE — SYR 10ML LUER LOK 1/5ML GRAD --

## (undated) DEVICE — DRAPE SHT 3 QTR PROXIMA 53X77 --

## (undated) DEVICE — SURGIFOAM SPNG SZ 100

## (undated) DEVICE — DRAPE MICSCP W51XL150IN FOR LEICA M680 WILD OHS

## (undated) DEVICE — KENDALL SCD EXPRESS SLEEVES, KNEE LENGTH, MEDIUM: Brand: KENDALL SCD

## (undated) DEVICE — SURGICAL PROCEDURE PACK MIN CV CDS

## (undated) DEVICE — (D)PREP SKN CHLRAPRP APPL 26ML -- CONVERT TO ITEM 371833

## (undated) DEVICE — SUTURE 3-0 VCRL CTD SH-1 J219H

## (undated) DEVICE — SUTURE VCRL SZ 3-0 L18IN ABSRB VLT L26MM SH 1/2 CIR J774D

## (undated) DEVICE — INTENDED FOR TISSUE SEPARATION, AND OTHER PROCEDURES THAT REQUIRE A SHARP SURGICAL BLADE TO PUNCTURE OR CUT.: Brand: BARD-PARKER SAFETY BLADES SIZE 15, STERILE

## (undated) DEVICE — KNIFE SURG L134MM SHT BAYNT

## (undated) DEVICE — DRAPE TWL SURG 16X26IN BLU ORB04] ALLCARE INC]

## (undated) DEVICE — NEEDLE SPNL 22GA L3.5IN BLK HUB S STL REG WALL FIT STYL W/

## (undated) DEVICE — 1010 S-DRAPE TOWEL DRAPE 10/BX: Brand: STERI-DRAPE™

## (undated) DEVICE — MONITOR CRD 1.4 CC 3.4 GM INSERTABLE LINQ

## (undated) DEVICE — REM POLYHESIVE ADULT PATIENT RETURN ELECTRODE: Brand: VALLEYLAB

## (undated) DEVICE — WAX SURG 2.5GM HEMSTAT BNE BEESWAX PARAFFIN ISO PALMITATE

## (undated) DEVICE — GOWN,REINFORCED,POLY,AURORA,XXLARGE,STR: Brand: MEDLINE

## (undated) DEVICE — AMD ANTIMICROBIAL NON-ADHERENT PAD,0.2% POLYHEXAMETHYLENE BIGUANIDE HCI (PHMB): Brand: TELFA

## (undated) DEVICE — DRSG POSTOP PRMSL AG 3.5X4IN

## (undated) DEVICE — TOOL T12MH25 LEGEND 12CM 2.5MM MH: Brand: MIDAS REX ™

## (undated) DEVICE — ADHESIVE SKIN CLSR 0.7ML TOP DERMBND ADV

## (undated) DEVICE — BAND RUB 1/8X2.5IN STRL --

## (undated) DEVICE — DERMABOND SKIN ADH 0.7ML -- DERMABOND ADVANCED 12/BX